# Patient Record
Sex: MALE | Race: WHITE | NOT HISPANIC OR LATINO | Employment: OTHER | URBAN - METROPOLITAN AREA
[De-identification: names, ages, dates, MRNs, and addresses within clinical notes are randomized per-mention and may not be internally consistent; named-entity substitution may affect disease eponyms.]

---

## 2017-01-03 ENCOUNTER — HOSPITAL ENCOUNTER (OUTPATIENT)
Dept: MRI IMAGING | Facility: HOSPITAL | Age: 77
Discharge: HOME | End: 2017-01-03
Attending: FAMILY MEDICINE | Admitting: FAMILY MEDICINE

## 2017-04-04 ENCOUNTER — HOSPITAL ENCOUNTER (OUTPATIENT)
Dept: PHYSICAL THERAPY | Facility: HOSPITAL | Age: 77
Setting detail: RECURRING SERIES
Discharge: HOME | End: 2017-04-28
Attending: NEUROLOGICAL SURGERY | Admitting: NEUROLOGICAL SURGERY

## 2018-02-16 ENCOUNTER — HOSPITAL ENCOUNTER (OUTPATIENT)
Dept: OTHER | Facility: HOSPITAL | Age: 78
Setting detail: SPECIMEN
Discharge: HOME | End: 2018-02-16
Attending: UROLOGY | Admitting: UROLOGY

## 2018-10-10 ENCOUNTER — HOSPITAL ENCOUNTER (OUTPATIENT)
Dept: CARDIOLOGY | Facility: HOSPITAL | Age: 78
Discharge: HOME | End: 2018-10-10
Attending: INTERNAL MEDICINE | Admitting: INTERNAL MEDICINE

## 2018-10-12 ENCOUNTER — HOSPITAL ENCOUNTER (OUTPATIENT)
Dept: OTHER | Facility: HOSPITAL | Age: 78
Discharge: HOME | End: 2018-10-12
Attending: INTERNAL MEDICINE | Admitting: INTERNAL MEDICINE

## 2018-10-12 LAB
ANION GAP SERPL CALC-SCNC: 11.4 MMOL/L (ref 10–20)
APTT BLD: 28.1 SEC (ref 24–31)
BASOPHILS # BLD AUTO: 0 10*3/UL (ref 0–0.2)
BASOPHILS NFR BLD AUTO: 1 % (ref 0–2)
BUN SERPL-MCNC: 11 MG/DL (ref 8–20)
BUN/CREAT SERPL: 10 (ref 6.2–20.3)
CALCIUM SERPL-MCNC: 9.1 MG/DL (ref 8.9–10.3)
CHLORIDE SERPL-SCNC: 105 MMOL/L (ref 101–111)
CHOLEST SERPL-MCNC: 120 MG/DL
CHOLEST/HDLC SERPL: 3.3 {RATIO}
CONV CO2: 28 MMOL/L (ref 22–32)
CONV LDL CHOLESTEROL DIRECT: 57 MG/DL (ref 0–100)
CREAT UR-MCNC: 1.1 MG/DL (ref 0.7–1.2)
DIFFERENTIAL METHOD BLD: (no result)
EOSINOPHIL # BLD AUTO: 0.4 10*3/UL (ref 0–0.3)
EOSINOPHIL # BLD AUTO: 6 % (ref 0–3)
ERYTHROCYTE [DISTWIDTH] IN BLOOD BY AUTOMATED COUNT: 12.9 % (ref 11.5–14.5)
GLUCOSE SERPL-MCNC: 100 MG/DL (ref 65–99)
HCT VFR BLD AUTO: 46.1 % (ref 40–54)
HDLC SERPL-MCNC: 36 MG/DL
HGB BLD-MCNC: 16 G/DL (ref 14–18)
INR PPP: 1.1
LDLC/HDLC SERPL: 1.6 {RATIO}
LIPID INTERPRETATION: ABNORMAL
LYMPHOCYTES # BLD AUTO: 1.4 10*3/UL (ref 0.8–4.8)
LYMPHOCYTES NFR BLD AUTO: 20 % (ref 18–42)
MCH RBC QN AUTO: 31.9 PG (ref 26–32)
MCHC RBC AUTO-ENTMCNC: 34.7 G/DL (ref 32–36)
MCV RBC AUTO: 92.1 FL (ref 80–94)
MONOCYTES # BLD AUTO: 0.6 10*3/UL (ref 0.1–1.3)
MONOCYTES NFR BLD AUTO: 8 % (ref 2–11)
NEUTROPHILS # BLD AUTO: 4.5 10*3/UL (ref 2.3–8.6)
NEUTROPHILS NFR BLD AUTO: 65 % (ref 50–75)
NRBC BLD AUTO-RTO: 0 /100{WBCS}
NRBC/RBC NFR BLD MANUAL: 0 10*3/UL
PLATELET # BLD AUTO: 226 10*3/UL (ref 150–450)
PMV BLD AUTO: 8.1 FL (ref 7.4–10.4)
POTASSIUM SERPL-SCNC: 4.4 MMOL/L (ref 3.6–5.1)
PROTHROMBIN TIME: 11.3 SEC (ref 9.6–11.7)
RBC # BLD AUTO: 5.01 10*6/UL (ref 4.6–6)
SODIUM SERPL-SCNC: 140 MMOL/L (ref 136–144)
TRIGL SERPL-MCNC: 95 MG/DL
VLDLC SERPL CALC-MCNC: 26.6 MG/DL
WBC # BLD AUTO: 6.9 10*3/UL (ref 4.5–11.5)

## 2019-08-16 PROBLEM — R94.39 ABNORMAL CARDIOVASCULAR STRESS TEST: Status: ACTIVE | Noted: 2018-10-10

## 2019-08-16 PROBLEM — Z83.3 FAMILY HISTORY OF DIABETES MELLITUS: Status: ACTIVE | Noted: 2019-08-16

## 2019-08-16 PROBLEM — Z82.3 FAMILY HISTORY OF CEREBROVASCULAR ACCIDENT (CVA): Status: ACTIVE | Noted: 2019-08-16

## 2019-08-16 PROBLEM — K21.9 GASTROESOPHAGEAL REFLUX DISEASE: Status: ACTIVE | Noted: 2019-08-16

## 2019-08-16 PROBLEM — E78.5 HYPERLIPIDEMIA: Status: ACTIVE | Noted: 2019-08-16

## 2019-08-16 RX ORDER — ASPIRIN 81 MG/1
TABLET ORAL
COMMUNITY
Start: 2012-06-01 | End: 2019-10-16

## 2019-08-16 RX ORDER — BRIMONIDINE TARTRATE 2 MG/ML
SOLUTION/ DROPS OPHTHALMIC
COMMUNITY
Start: 2019-04-10 | End: 2019-10-16

## 2019-08-16 RX ORDER — ESCITALOPRAM OXALATE 20 MG/1
20 TABLET ORAL DAILY
COMMUNITY
Start: 2019-04-10

## 2019-08-16 RX ORDER — RANITIDINE 150 MG/1
TABLET ORAL
COMMUNITY
Start: 2012-06-01 | End: 2019-10-16

## 2019-08-16 RX ORDER — GABAPENTIN 300 MG/1
CAPSULE ORAL
COMMUNITY
Start: 2015-12-07 | End: 2019-10-16

## 2019-08-16 RX ORDER — VALSARTAN AND HYDROCHLOROTHIAZIDE 160; 12.5 MG/1; MG/1
TABLET, FILM COATED ORAL
COMMUNITY
Start: 2015-12-07 | End: 2019-10-16

## 2019-08-16 RX ORDER — DONEPEZIL HYDROCHLORIDE 5 MG/1
5 TABLET, FILM COATED ORAL 2 TIMES DAILY
COMMUNITY
Start: 2019-04-10 | End: 2019-09-20 | Stop reason: SDUPTHER

## 2019-08-16 RX ORDER — DORZOLAMIDE HCL 20 MG/ML
1 SOLUTION/ DROPS OPHTHALMIC EVERY 24 HOURS
COMMUNITY
Start: 2018-02-15 | End: 2020-07-01

## 2019-08-16 RX ORDER — TAMSULOSIN HYDROCHLORIDE 0.4 MG/1
CAPSULE ORAL
COMMUNITY
Start: 2015-12-07 | End: 2019-10-16

## 2019-08-16 RX ORDER — BUPROPION HYDROCHLORIDE 150 MG/1
TABLET, EXTENDED RELEASE ORAL
COMMUNITY
Start: 2019-04-10 | End: 2019-10-16

## 2019-08-16 RX ORDER — AMLODIPINE BESYLATE 2.5 MG/1
TABLET ORAL EVERY 24 HOURS
COMMUNITY
Start: 2019-04-10 | End: 2019-10-16

## 2019-08-28 ENCOUNTER — OFFICE VISIT (OUTPATIENT)
Dept: CARDIOLOGY | Facility: CLINIC | Age: 79
End: 2019-08-28

## 2019-08-28 VITALS
DIASTOLIC BLOOD PRESSURE: 78 MMHG | HEIGHT: 72 IN | SYSTOLIC BLOOD PRESSURE: 160 MMHG | OXYGEN SATURATION: 96 % | HEART RATE: 60 BPM | WEIGHT: 223.4 LBS | BODY MASS INDEX: 30.26 KG/M2

## 2019-08-28 DIAGNOSIS — I10 ESSENTIAL HYPERTENSION: ICD-10-CM

## 2019-08-28 DIAGNOSIS — G47.33 SLEEP APNEA, OBSTRUCTIVE: ICD-10-CM

## 2019-08-28 DIAGNOSIS — E78.00 PURE HYPERCHOLESTEROLEMIA: ICD-10-CM

## 2019-08-28 DIAGNOSIS — I25.118 CORONARY ARTERY DISEASE OF NATIVE ARTERY OF NATIVE HEART WITH STABLE ANGINA PECTORIS (HCC): Primary | ICD-10-CM

## 2019-08-28 PROCEDURE — 99213 OFFICE O/P EST LOW 20 MIN: CPT | Performed by: INTERNAL MEDICINE

## 2019-08-28 RX ORDER — CHOLECALCIFEROL (VITAMIN D3) 25 MCG
1 TABLET,CHEWABLE ORAL DAILY
Refills: 11 | COMMUNITY
Start: 2019-08-14

## 2019-08-28 NOTE — PROGRESS NOTES
"    Subjective:     Encounter Date:08/28/2019      Patient ID: Naun Brooks is a 79 y.o. male.    Chief Complaint:  History of Present Illness 79-year-old white male with history of coronary artery disease hypertension hyperlipidemia sleep apnea presents to my office for follow-up.  Patient is currently stable without any symptoms of chest pain or shortness of breath at rest on exertion.  No complaints of any PND orthopnea.  No palpitations dizziness syncope or swelling of the feet.  Patient is taking all the medicines regularly.  He has sleep apnea and uses a CPAP machine.    The following portions of the patient's history were reviewed and updated as appropriate: allergies, current medications, past family history, past medical history, past social history, past surgical history and problem list.  Past Medical History:   Diagnosis Date   • Coronary artery disease    • Hyperlipidemia    • Hypertension      Past Surgical History:   Procedure Laterality Date   • CENTRAL SHUNT       /78 (BP Location: Left arm, Patient Position: Sitting)   Pulse 60   Ht 182.9 cm (72\")   Wt 101 kg (223 lb 6.4 oz)   SpO2 96%   BMI 30.30 kg/m²   History reviewed. No pertinent family history.    Current Outpatient Medications:   •  amLODIPine (NORVASC) 2.5 MG tablet, Daily., Disp: , Rfl:   •  aspirin (ASPIR-LOW) 81 MG EC tablet, ASPIR-LOW 81 MG TBEC, Disp: , Rfl:   •  buPROPion SR (WELLBUTRIN SR) 150 MG 12 hr tablet, BUPROPION HCL ER (SR) 150 MG XR12H-TAB, Disp: , Rfl:   •  Cyanocobalamin (B-12) 1000 MCG tablet controlled-release, Take 1 tablet by mouth Daily., Disp: , Rfl: 11  •  Cyanocobalamin (B-12) 1000 MCG/ML kit, B-12 1000 MCG/ML INJECTION KIT, Disp: , Rfl:   •  donepezil (ARICEPT) 5 MG tablet, Daily., Disp: , Rfl:   •  dorzolamide (TRUSOPT) 2 % ophthalmic solution, Daily., Disp: , Rfl:   •  escitalopram (LEXAPRO) 20 MG tablet, Daily., Disp: , Rfl:   •  gabapentin (NEURONTIN) 300 MG capsule, GABAPENTIN 300 MG CAPS, Disp: , " Rfl:   •  hydrocortisone 2.5 % cream, APPLY TO AFFECTED AREA TWICE A DAY, Disp: , Rfl: 3  •  Multiple Vitamins-Minerals (MULTI VITAMIN/MINERALS) tablet, MULTI VITAMIN/MINERALS TABS, Disp: , Rfl:   •  Omega-3 Fatty Acids (EQL OMEGA 3 FISH OIL) 1400 MG capsule, EQL OMEGA 3 FISH OIL 1400 MG ORAL CAPSULE, Disp: , Rfl:   •  raNITIdine (ZANTAC) 150 MG tablet, RANITIDINE  MG TABS, Disp: , Rfl:   •  tamsulosin (FLOMAX) 0.4 MG capsule 24 hr capsule, TAMSULOSIN HCL 0.4 MG CAPS, Disp: , Rfl:   •  valsartan-hydrochlorothiazide (DIOVAN-HCT) 160-12.5 MG per tablet, VALSARTAN-HYDROCHLOROTHIAZIDE 160-12.5 MG TABS, Disp: , Rfl:   •  brimonidine (ALPHAGAN) 0.2 % ophthalmic solution, BRIMONIDINE TARTRATE 0.2 % SOLN, Disp: , Rfl:   Allergies   Allergen Reactions   • Dust Mite Extract Irritability   • Molds & Smuts Irritability   • Tree Extract Irritability     Social History     Socioeconomic History   • Marital status:      Spouse name: Not on file   • Number of children: Not on file   • Years of education: Not on file   • Highest education level: Not on file   Tobacco Use   • Smoking status: Former Smoker   Substance and Sexual Activity   • Alcohol use: No     Frequency: Never     Review of Systems   Constitution: Negative for fever and malaise/fatigue.   Cardiovascular: Negative for chest pain, dyspnea on exertion and palpitations.   Respiratory: Negative for cough and shortness of breath.    Skin: Negative for rash.   Gastrointestinal: Negative for abdominal pain, nausea and vomiting.   Neurological: Negative for focal weakness and headaches.   All other systems reviewed and are negative.             Objective:     Physical Exam   Constitutional: He appears well-developed and well-nourished.   HENT:   Head: Normocephalic and atraumatic.   Eyes: Conjunctivae are normal. No scleral icterus.   Neck: Normal range of motion. Neck supple. No JVD present. Carotid bruit is not present.   Cardiovascular: Normal rate, regular  rhythm, S1 normal, S2 normal, normal heart sounds and intact distal pulses. PMI is not displaced.   Pulmonary/Chest: Effort normal and breath sounds normal. He has no wheezes. He has no rales.   Abdominal: Soft. Bowel sounds are normal.   Neurological: He is alert. He has normal strength.   Skin: Skin is warm and dry. No rash noted.     Procedures    Lab Review:       Assessment:          Diagnosis Plan   1. Coronary artery disease of native artery of native heart with stable angina pectoris (CMS/HCC)     2. Essential hypertension     3. Pure hypercholesterolemia     4. Sleep apnea, obstructive            Plan:       Patient has nonobstructive coronary disease with normal function is currently stable on medications.  Patient blood pressure and heart rate are stable per  Patient has sleep apnea and uses a CPAP machine.  Patient's lipid levels are followed by the primary care doctor.  Continue current medicines and follow her in 6 months

## 2019-09-20 ENCOUNTER — TELEPHONE (OUTPATIENT)
Dept: NEUROLOGY | Facility: CLINIC | Age: 79
End: 2019-09-20

## 2019-09-20 RX ORDER — DONEPEZIL HYDROCHLORIDE 5 MG/1
5 TABLET, FILM COATED ORAL 2 TIMES DAILY
Qty: 60 TABLET | Refills: 1 | Status: SHIPPED | OUTPATIENT
Start: 2019-09-20 | End: 2019-09-30 | Stop reason: DRUGHIGH

## 2019-09-29 RX ORDER — DONEPEZIL HYDROCHLORIDE 5 MG/1
TABLET, FILM COATED ORAL
Qty: 90 TABLET | Refills: 1 | Status: CANCELLED | OUTPATIENT
Start: 2019-09-29

## 2019-09-30 RX ORDER — DONEPEZIL HYDROCHLORIDE 10 MG/1
10 TABLET, FILM COATED ORAL NIGHTLY
Qty: 60 TABLET | Refills: 1 | Status: SHIPPED | OUTPATIENT
Start: 2019-09-30 | End: 2019-10-01 | Stop reason: SDUPTHER

## 2019-10-01 RX ORDER — DONEPEZIL HYDROCHLORIDE 10 MG/1
10 TABLET, FILM COATED ORAL DAILY
Qty: 90 TABLET | Refills: 1 | Status: SHIPPED | OUTPATIENT
Start: 2019-10-01 | End: 2020-04-13

## 2019-10-16 RX ORDER — TAMSULOSIN HYDROCHLORIDE 0.4 MG/1
1 CAPSULE ORAL DAILY
COMMUNITY
End: 2020-07-01

## 2019-10-16 RX ORDER — GABAPENTIN 300 MG/1
300 CAPSULE ORAL 3 TIMES DAILY
COMMUNITY

## 2019-10-16 RX ORDER — BUPROPION HYDROCHLORIDE 150 MG/1
150 TABLET ORAL DAILY
COMMUNITY

## 2019-10-16 RX ORDER — RANITIDINE 150 MG/1
150 TABLET ORAL 2 TIMES DAILY
COMMUNITY
End: 2020-05-14

## 2019-10-16 RX ORDER — VALSARTAN AND HYDROCHLOROTHIAZIDE 160; 12.5 MG/1; MG/1
1 TABLET, FILM COATED ORAL 2 TIMES DAILY
COMMUNITY

## 2019-10-16 RX ORDER — LORATADINE 10 MG/1
10 CAPSULE, LIQUID FILLED ORAL DAILY
COMMUNITY
End: 2020-07-01

## 2019-10-17 ENCOUNTER — ANESTHESIA EVENT (OUTPATIENT)
Dept: GASTROENTEROLOGY | Facility: HOSPITAL | Age: 79
End: 2019-10-17

## 2019-10-18 ENCOUNTER — HOSPITAL ENCOUNTER (OUTPATIENT)
Facility: HOSPITAL | Age: 79
Setting detail: HOSPITAL OUTPATIENT SURGERY
Discharge: HOME | End: 2019-10-18
Attending: INTERNAL MEDICINE | Admitting: INTERNAL MEDICINE

## 2019-10-18 ENCOUNTER — ON CAMPUS - OUTPATIENT (AMBULATORY)
Dept: URBAN - METROPOLITAN AREA HOSPITAL 85 | Facility: HOSPITAL | Age: 79
End: 2019-10-18

## 2019-10-18 ENCOUNTER — ANESTHESIA (OUTPATIENT)
Dept: GASTROENTEROLOGY | Facility: HOSPITAL | Age: 79
End: 2019-10-18

## 2019-10-18 VITALS
SYSTOLIC BLOOD PRESSURE: 111 MMHG | OXYGEN SATURATION: 95 % | HEIGHT: 72 IN | DIASTOLIC BLOOD PRESSURE: 48 MMHG | TEMPERATURE: 97.6 F | BODY MASS INDEX: 30.43 KG/M2 | WEIGHT: 224.65 LBS | RESPIRATION RATE: 12 BRPM | HEART RATE: 48 BPM

## 2019-10-18 DIAGNOSIS — K63.5 POLYP OF COLON: ICD-10-CM

## 2019-10-18 DIAGNOSIS — D12.3 BENIGN NEOPLASM OF TRANSVERSE COLON: ICD-10-CM

## 2019-10-18 DIAGNOSIS — Z86.010 PERSONAL HISTORY OF COLONIC POLYPS: ICD-10-CM

## 2019-10-18 DIAGNOSIS — K57.30 DIVERTICULOSIS OF LARGE INTESTINE WITHOUT PERFORATION OR ABS: ICD-10-CM

## 2019-10-18 DIAGNOSIS — D12.5 BENIGN NEOPLASM OF SIGMOID COLON: ICD-10-CM

## 2019-10-18 DIAGNOSIS — K64.1 SECOND DEGREE HEMORRHOIDS: ICD-10-CM

## 2019-10-18 PROCEDURE — 25010000002 PROPOFOL 200 MG/20ML EMULSION: Performed by: ANESTHESIOLOGY

## 2019-10-18 PROCEDURE — 88305 TISSUE EXAM BY PATHOLOGIST: CPT | Performed by: INTERNAL MEDICINE

## 2019-10-18 PROCEDURE — 45385 COLONOSCOPY W/LESION REMOVAL: CPT | Mod: PT | Performed by: INTERNAL MEDICINE

## 2019-10-18 RX ORDER — SODIUM CHLORIDE 9 MG/ML
9 INJECTION, SOLUTION INTRAVENOUS CONTINUOUS PRN
Status: DISCONTINUED | OUTPATIENT
Start: 2019-10-18 | End: 2019-10-18 | Stop reason: HOSPADM

## 2019-10-18 RX ORDER — PROPOFOL 10 MG/ML
INJECTION, EMULSION INTRAVENOUS AS NEEDED
Status: DISCONTINUED | OUTPATIENT
Start: 2019-10-18 | End: 2019-10-18 | Stop reason: SURG

## 2019-10-18 RX ORDER — SODIUM CHLORIDE 0.9 % (FLUSH) 0.9 %
3 SYRINGE (ML) INJECTION EVERY 12 HOURS SCHEDULED
Status: DISCONTINUED | OUTPATIENT
Start: 2019-10-18 | End: 2019-10-18 | Stop reason: HOSPADM

## 2019-10-18 RX ORDER — SODIUM CHLORIDE 0.9 % (FLUSH) 0.9 %
10 SYRINGE (ML) INJECTION AS NEEDED
Status: DISCONTINUED | OUTPATIENT
Start: 2019-10-18 | End: 2019-10-18 | Stop reason: HOSPADM

## 2019-10-18 RX ORDER — LIDOCAINE HYDROCHLORIDE 20 MG/ML
INJECTION, SOLUTION EPIDURAL; INFILTRATION; INTRACAUDAL; PERINEURAL AS NEEDED
Status: DISCONTINUED | OUTPATIENT
Start: 2019-10-18 | End: 2019-10-18 | Stop reason: SURG

## 2019-10-18 RX ORDER — SODIUM CHLORIDE 0.9 % (FLUSH) 0.9 %
3-10 SYRINGE (ML) INJECTION AS NEEDED
Status: DISCONTINUED | OUTPATIENT
Start: 2019-10-18 | End: 2019-10-18 | Stop reason: HOSPADM

## 2019-10-18 RX ORDER — SODIUM CHLORIDE 9 MG/ML
30 INJECTION, SOLUTION INTRAVENOUS CONTINUOUS PRN
Status: DISCONTINUED | OUTPATIENT
Start: 2019-10-18 | End: 2019-10-18 | Stop reason: HOSPADM

## 2019-10-18 RX ADMIN — PROPOFOL 260 MG: 10 INJECTION, EMULSION INTRAVENOUS at 09:17

## 2019-10-18 RX ADMIN — SODIUM CHLORIDE: 0.9 INJECTION, SOLUTION INTRAVENOUS at 08:42

## 2019-10-18 RX ADMIN — SODIUM CHLORIDE 9 ML/HR: 0.9 INJECTION, SOLUTION INTRAVENOUS at 08:34

## 2019-10-18 RX ADMIN — LIDOCAINE HYDROCHLORIDE 100 MG: 20 INJECTION, SOLUTION EPIDURAL; INFILTRATION; INTRACAUDAL; PERINEURAL at 08:49

## 2019-10-18 NOTE — DISCHARGE INSTRUCTIONS
A responsible adult should stay with you and you should rest quietly for the rest of the day.    Do not drink alcohol, drive, operate any heavy machinery or power tools or make any legal/important decisions for the next 24 hours.     Progress your diet as tolerated.  If you begin to experience severe pain, increased shortness of breath, racing heartbeat or a fever above 101 F, seek immediate medical attention.     Follow up with MD as instructed. Call office for results in 3 to 5 days if needed.    [phone]

## 2019-10-18 NOTE — ANESTHESIA PREPROCEDURE EVALUATION
Anesthesia Evaluation     Patient summary reviewed and Nursing notes reviewed   NPO Solid Status: > 8 hours  NPO Liquid Status: > 8 hours           Airway   Mallampati: II  No difficulty expected  Dental - normal exam     Pulmonary    (+) sleep apnea on CPAP,   Cardiovascular     Rhythm: regular  Rate: abnormal    (+) hypertension, CAD, hyperlipidemia,     ROS comment: Denies known MI, angina  PE comment: Bradycardic.  Pt states he's normally in 40-50's range    Neuro/Psych  (+) dizziness/light headedness,     GI/Hepatic/Renal/Endo    (+)  GERD,      Musculoskeletal     Abdominal    Substance History      OB/GYN          Other                        Anesthesia Plan    ASA 3     MAC     Anesthetic plan, all risks, benefits, and alternatives have been provided, discussed and informed consent has been obtained with: patient.

## 2019-10-18 NOTE — OP NOTE
COLONOSCOPY  Procedure Report    Patient Name:  Naun Brooks  YOB: 1940    Date of Surgery:  10/18/2019     Indications: Personal history of colon polyps    Pre-op Diagnosis:   PERSONAL HX COLON POLYPS         Post-op Diagnosis:  1.  Colonoscopy polypectomy x1 at the hepatic flexure and x1 in the sigmoid colon  2.  Mild to moderate sigmoid diverticulosis      Procedure(s):  COLONOSCOPY with polypectomy x 2 and fulgurate 1 polyp    Staff:  Surgeon(s):  Kevin Cuevas MD         Anesthesia: Monitored Anesthesia Care    Estimated Blood Loss: None  Implants:    Nothing was implanted during the procedure    Specimen:          Hepatic flexure polyp and sigmoid polyp    Complications:  None    Description of Procedure:  Informed consent was obtained from the patient.  They were placed in the left lateral decubitus position and IV conscious sedation was administered by anesthesia while being monitored continuously throughout the procedure.  Digital rectal examination was performed without external hemorrhoid fissure or fistula.  Digital exam of the anal canal rectal vault was unremarkable.  The video Olympus colonoscope was introduced into the rectum and advanced to the cecum where the ileocecal valve and appendiceal orifice were identified and photographed.  The prep was excellent.  On slow withdrawal close inspection was performed.  There are no ischemic, vascular, or inflammatory lesions.  In the hepatic flexure is a 1.5 cm sessile polyp that was removed in piecemeal fashion using hot snare with electrocautery hemostasis.  On continued withdrawal a second polyp of approximately 4 mm size was identified in the sigmoid colon it was snared removed with electrocautery hemostasis.  There is mild to moderate sigmoid diverticulosis.  Grade 2 internal hemorrhoids were noted on retroflexion.  The scope was removed he tolerated the procedure well returned to recovery in good condition.      Impression:  1.   Colonoscopy with polypectomy x2  2.  Diverticulosis    Recommendations:  1.  Call for any postop pain fever bleeding  2.  Call in 3 days for biopsy results.  3.  High-fiber diet  4.  Repeat colonoscopy in 3 years  5.  We appreciate the referral thank you      Kevin Cuevas MD     Date: 10/18/2019  Time: 9:18 AM

## 2019-10-18 NOTE — ANESTHESIA POSTPROCEDURE EVALUATION
Patient: Naun Brooks    Procedure Summary     Date:  10/18/19 Room / Location:  [facility] ENDOSCOPY 4 / [facility] ENDOSCOPY    Anesthesia Start:  0842 Anesthesia Stop:  0920    Procedure:  COLONOSCOPY with polypectomy x 2 and fulgurate 1 polyp (N/A ) Diagnosis:  (PERSONAL HX COLON POLYPS)    Surgeon:  Kevin Cuevas MD Provider:  Yue Olvera MD    Anesthesia Type:  MAC ASA Status:  3          Anesthesia Type: MAC  Last vitals  BP   111/48 (10/18/19 0932)   Temp   97.6 °F (36.4 °C) (10/18/19 0834)   Pulse   (!) 48 (10/18/19 0932)   Resp   12 (10/18/19 0932)     SpO2   95 % (10/18/19 0932)     Post Anesthesia Care and Evaluation    Patient location during evaluation: PHASE II  Patient participation: complete - patient participated  Level of consciousness: awake  Pain scale: See nurse's notes for pain score.  Pain management: adequate  Airway patency: patent  Anesthetic complications: No anesthetic complications  PONV Status: none  Cardiovascular status: acceptable  Respiratory status: acceptable  Hydration status: acceptable    Comments: Patient seen and examined postoperatively; vital signs stable; SpO2 greater than or equal to 90%; cardiopulmonary status stable; nausea/vomiting adequately controlled; pain adequately controlled; no apparent anesthesia complications; patient discharged from anesthesia care when discharge criteria were met

## 2019-10-18 NOTE — H&P
" LOS: 0 days   Patient Care Team:  Martha Lee MD as PCP - General      Subjective     Interval History:     Subjective: History of 1.2 cm pedunculated adenomatous polyp in 2016    ROS:   No chest pain, shortness of breath, or cough.  No melena or hematochezia  No change since scanned H&P       Medication Review:   No current facility-administered medications for this encounter.     Current Outpatient Medications:   •  buPROPion XL (WELLBUTRIN XL) 150 MG 24 hr tablet, Take 150 mg by mouth Daily., Disp: , Rfl:   •  Cyanocobalamin (B-12) 1000 MCG tablet controlled-release, Take 1 tablet by mouth Daily., Disp: , Rfl: 11  •  donepezil (ARICEPT) 10 MG tablet, Take 1 tablet by mouth Daily., Disp: 90 tablet, Rfl: 1  •  dorzolamide (TRUSOPT) 2 % ophthalmic solution, Administer 1 drop to both eyes Daily., Disp: , Rfl:   •  escitalopram (LEXAPRO) 20 MG tablet, Take 20 mg by mouth Daily., Disp: , Rfl:   •  gabapentin (NEURONTIN) 300 MG capsule, Take 300 mg by mouth 2 (Two) Times a Day., Disp: , Rfl:   •  Loratadine 10 MG capsule, Take 10 mg by mouth Daily., Disp: , Rfl:   •  Omega-3 Fatty Acids (EQL OMEGA 3 FISH OIL) 1400 MG capsule, Take 1,600 mg by mouth 2 (Two) Times a Day., Disp: , Rfl:   •  raNITIdine (ZANTAC) 150 MG tablet, Take 150 mg by mouth 2 (Two) Times a Day., Disp: , Rfl:   •  tamsulosin (FLOMAX) 0.4 MG capsule 24 hr capsule, Take 1 capsule by mouth Daily., Disp: , Rfl:   •  valsartan-hydrochlorothiazide (DIOVAN-HCT) 160-12.5 MG per tablet, Take 1 tablet by mouth 2 (Two) Times a Day., Disp: , Rfl:       Objective     Vital Signs  Vitals:    10/16/19 1435   Weight: 102 kg (225 lb)   Height: 182.9 cm (72\")       Physical Exam:    General Appearance:    Awake and alert, in no acute distress   Head:    Normocephalic, without obvious abnormality   Eyes:          Conjunctivae normal, anicteric sclera   Throat:   No oral lesions, no thrush, oral mucosa moist   Neck:   No adenopathy, supple, no JVD   CV/Lungs:     " RRR, respirations regular, even and unlabored   Abdomen:     Soft, non-tender, no rebound or guarding, non-distended, no hepatosplenomegaly   Rectal:     Deferred   Extremities:   No edema, no cyanosis   Skin:   No bruising or rash, no jaundice        Results Review:    Lab Results (last 24 hours)     ** No results found for the last 24 hours. **          Imaging Results (last 24 hours)     ** No results found for the last 24 hours. **            Assessment/Plan   Proceed with scope and MAC anesthesia    Proceed with colonoscopy    Kevin Cuevas MD  10/18/19  7:24 AM

## 2019-10-21 LAB
LAB AP CASE REPORT: NORMAL
PATH REPORT.FINAL DX SPEC: NORMAL
PATH REPORT.GROSS SPEC: NORMAL

## 2020-04-13 RX ORDER — DONEPEZIL HYDROCHLORIDE 10 MG/1
TABLET, FILM COATED ORAL
Qty: 90 TABLET | Refills: 1 | Status: SHIPPED | OUTPATIENT
Start: 2020-04-13 | End: 2020-10-05

## 2020-05-14 ENCOUNTER — OFFICE VISIT (OUTPATIENT)
Dept: NEUROLOGY | Facility: CLINIC | Age: 80
End: 2020-05-14

## 2020-05-14 VITALS
BODY MASS INDEX: 30.91 KG/M2 | WEIGHT: 228.2 LBS | DIASTOLIC BLOOD PRESSURE: 81 MMHG | SYSTOLIC BLOOD PRESSURE: 153 MMHG | HEART RATE: 57 BPM | HEIGHT: 72 IN | TEMPERATURE: 98.2 F

## 2020-05-14 DIAGNOSIS — G47.33 SLEEP APNEA, OBSTRUCTIVE: Primary | ICD-10-CM

## 2020-05-14 DIAGNOSIS — R41.3 MEMORY IMPAIRMENT: ICD-10-CM

## 2020-05-14 PROCEDURE — 99214 OFFICE O/P EST MOD 30 MIN: CPT | Performed by: PSYCHIATRY & NEUROLOGY

## 2020-05-14 RX ORDER — FAMOTIDINE 10 MG
10 TABLET ORAL 2 TIMES DAILY
COMMUNITY
End: 2020-07-01

## 2020-05-14 RX ORDER — FLUTICASONE PROPIONATE 50 MCG
2 SPRAY, SUSPENSION (ML) NASAL DAILY
COMMUNITY

## 2020-05-14 NOTE — PROGRESS NOTES
Sleep medicine follow-up visit    Naun Brooks   1940  79 y.o. male   DATE OF SERVICE: 5/14/2020     Yearly f/u for cpap compliance, pt. doing well with pap therapy.  pt. states sleeps fair uses FFM and goes through [facility] for supplies. Patient having issues with the mask bothering his nose.    Pt has trouble going to sleep and then wakes too early   Wake up at 630 to 7 goes to sleep about midnight.  Takes many short naps during the day    Has some pain in feet for which he takes gabapentin    SLEEP TESTING HISTORY:    On NPSG at [facility] , 05/07/2006 patient had Severe obstructive sleep apnea syndrome with apnea-hypopnea index of 79 per sleep hour, minimum SpO2 of 85%    The compliance data  Not available, no recent  Data available    Memory impairment, currently taking Aricept 10mg qd no change. MMSE 19    Obesity, has gained weight.    Review of Systems   Constitutional: Negative for appetite change and fatigue.   HENT: Positive for postnasal drip. Negative for sinus pressure and sinus pain.    Eyes: Positive for visual disturbance. Negative for pain and itching.   Respiratory: Positive for apnea. Negative for cough and shortness of breath.    Gastrointestinal: Negative for constipation and diarrhea.   Endocrine: Negative for cold intolerance and heat intolerance.   Genitourinary: Negative for difficulty urinating and frequency.   Musculoskeletal: Positive for back pain. Negative for neck pain.   Allergic/Immunologic: Positive for environmental allergies. Negative for food allergies.   Neurological: Negative for dizziness, tremors, seizures, syncope, facial asymmetry, speech difficulty, weakness, light-headedness, numbness and headaches.   Psychiatric/Behavioral: Positive for agitation. Negative for confusion.     I reviewed and addressed ROS entered by MA.        The following portions of the patient's history were reviewed and updated as appropriate: allergies, current medications, past family history, past  medical history, past social history, past surgical history and problem list.      History reviewed. No pertinent family history.    Past Medical History:   Diagnosis Date   • Chronic vertigo 12/7/2016   • Coronary artery disease    • GERD (gastroesophageal reflux disease)    • Hyperlipidemia    • Hypertension    • Memory loss        Social History     Socioeconomic History   • Marital status:      Spouse name: Not on file   • Number of children: Not on file   • Years of education: Not on file   • Highest education level: Not on file   Tobacco Use   • Smoking status: Former Smoker   • Smokeless tobacco: Never Used   Substance and Sexual Activity   • Alcohol use: No     Frequency: Never   • Drug use: No   • Sexual activity: Yes     Partners: Female         Current Outpatient Medications:   •  aspirin 81 MG tablet, Take 81 mg by mouth Daily., Disp: , Rfl:   •  buPROPion XL (WELLBUTRIN XL) 150 MG 24 hr tablet, Take 150 mg by mouth Daily., Disp: , Rfl:   •  Cyanocobalamin (B-12) 1000 MCG tablet controlled-release, Take 1 tablet by mouth Daily., Disp: , Rfl: 11  •  donepezil (ARICEPT) 10 MG tablet, TAKE 1 TABLET BY MOUTH EVERY DAY, Disp: 90 tablet, Rfl: 1  •  dorzolamide (TRUSOPT) 2 % ophthalmic solution, Administer 1 drop to both eyes Daily., Disp: , Rfl:   •  escitalopram (LEXAPRO) 20 MG tablet, Take 20 mg by mouth Daily., Disp: , Rfl:   •  famotidine (PEPCID) 10 MG tablet, Take 10 mg by mouth 2 (Two) Times a Day., Disp: , Rfl:   •  fluticasone (FLONASE) 50 MCG/ACT nasal spray, 2 sprays into the nostril(s) as directed by provider Daily., Disp: , Rfl:   •  gabapentin (NEURONTIN) 300 MG capsule, Take 300 mg by mouth 2 (Two) Times a Day., Disp: , Rfl:   •  Loratadine 10 MG capsule, Take 10 mg by mouth Daily., Disp: , Rfl:   •  Omega-3 Fatty Acids (EQL OMEGA 3 FISH OIL) 1400 MG capsule, Take 1,600 mg by mouth 2 (Two) Times a Day., Disp: , Rfl:   •  tamsulosin (FLOMAX) 0.4 MG capsule 24 hr capsule, Take 1 capsule by  "mouth Daily., Disp: , Rfl:   •  valsartan-hydrochlorothiazide (DIOVAN-HCT) 160-12.5 MG per tablet, Take 1 tablet by mouth 2 (Two) Times a Day., Disp: , Rfl:     Allergies   Allergen Reactions   • Dust Mite Extract Irritability   • Molds & Smuts Irritability   • Tree Extract Irritability        PHYSICAL EXAMINATION:  Vitals:    05/14/20 0842   BP: 153/81   Pulse: 57   Temp: 98.2 °F (36.8 °C)      Body mass index is 30.95 kg/m².       HEENT: Normal.      EXTREMITIES: No edema.     IMPRESSION:   Patient with obstructive sleep apnea syndrome with hypersomnia successfully treated with CPAP therapy and is compliant and benefiting from it.     Poor memory, stable, mmse score was 16 last year and 19 today,     RECOMMENDATIONS:     1. Continue present CPAP.     2. Follow up 1 year.     3. Poor memory , stable on Aricept and B12    4. Pt encouraged to lose weight    EPWORTH SLEEPINESS SCALE  Sitting and reading  0  WatchingTV  3  Sitting, inactive, in a public place  0  As a passenger in a car for 1 hour w/o a break  0  Lying down to rest in the afternoon  0  Sitting and talking to someone  0  Sitting quietly after a lunch  3  In a car, while stopped for traffic or a light  0  Total 6    Maximum   Score Patient's   Score Questions   5 5 \"What is the year?Season?Date?Day of the week?Month?\"   5 4 \"Where are we now: State?County?Town/city?Hospital?Floor?\"   3 3 3 Unrelated objects Number of trials:___   5 0 Count backward from 100 by sevens or spell WORLD backwards   3 1 Name 3 things from above   2 2 Identify 2 objects   1 0 Repeat the phrase: No ifs, ands,or buts.   3 3 Take paper in right hand, fold it in half, and put it on the floor.   1 1 Please read this and do what it says. \"Close your eyes\"   1 0 Make up and write a sentence about anything. Noun and verb   1 0 Copy this picture 10 angles must be present.   30 19 Total MMSE         This document has been electronically signed by Joseph Seipel, MD on May 14, 2020 09:07  "

## 2020-05-18 ENCOUNTER — TELEPHONE (OUTPATIENT)
Dept: NEUROLOGY | Facility: CLINIC | Age: 80
End: 2020-05-18

## 2020-05-18 DIAGNOSIS — G47.33 SLEEP APNEA, OBSTRUCTIVE: Primary | ICD-10-CM

## 2020-05-19 NOTE — TELEPHONE ENCOUNTER
----- Message from Joseph F Seipel, MD sent at 5/14/2020  9:03 AM EDT -----   FF[facility] and goes through [facility]   Having problems with mask leaking  Wants to try different FFM

## 2020-06-23 ENCOUNTER — TRANSCRIBE ORDERS (OUTPATIENT)
Dept: ADMINISTRATIVE | Facility: HOSPITAL | Age: 80
End: 2020-06-23

## 2020-06-25 ENCOUNTER — TRANSCRIBE ORDERS (OUTPATIENT)
Dept: ADMINISTRATIVE | Facility: HOSPITAL | Age: 80
End: 2020-06-25

## 2020-06-25 DIAGNOSIS — R60.0 LEG EDEMA, RIGHT: Primary | ICD-10-CM

## 2020-07-01 ENCOUNTER — OFFICE VISIT (OUTPATIENT)
Dept: CARDIOLOGY | Facility: CLINIC | Age: 80
End: 2020-07-01

## 2020-07-01 ENCOUNTER — HOSPITAL ENCOUNTER (OUTPATIENT)
Dept: CARDIOLOGY | Facility: HOSPITAL | Age: 80
Discharge: HOME | End: 2020-07-01
Admitting: FAMILY MEDICINE

## 2020-07-01 VITALS
DIASTOLIC BLOOD PRESSURE: 80 MMHG | WEIGHT: 230 LBS | BODY MASS INDEX: 31.15 KG/M2 | HEIGHT: 72 IN | OXYGEN SATURATION: 93 % | SYSTOLIC BLOOD PRESSURE: 161 MMHG | HEART RATE: 57 BPM

## 2020-07-01 DIAGNOSIS — I25.118 CORONARY ARTERY DISEASE OF NATIVE ARTERY OF NATIVE HEART WITH STABLE ANGINA PECTORIS (HCC): Primary | ICD-10-CM

## 2020-07-01 DIAGNOSIS — R60.0 LEG EDEMA, RIGHT: ICD-10-CM

## 2020-07-01 DIAGNOSIS — E78.00 PURE HYPERCHOLESTEROLEMIA: ICD-10-CM

## 2020-07-01 DIAGNOSIS — G47.33 SLEEP APNEA, OBSTRUCTIVE: ICD-10-CM

## 2020-07-01 DIAGNOSIS — I10 ESSENTIAL HYPERTENSION: ICD-10-CM

## 2020-07-01 LAB
BH CV LOWER VASCULAR LEFT COMMON FEMORAL AUGMENT: NORMAL
BH CV LOWER VASCULAR LEFT COMMON FEMORAL COMPETENT: NORMAL
BH CV LOWER VASCULAR LEFT COMMON FEMORAL COMPRESS: NORMAL
BH CV LOWER VASCULAR LEFT COMMON FEMORAL PHASIC: NORMAL
BH CV LOWER VASCULAR LEFT COMMON FEMORAL SPONT: NORMAL
BH CV LOWER VASCULAR RIGHT COMMON FEMORAL AUGMENT: NORMAL
BH CV LOWER VASCULAR RIGHT COMMON FEMORAL COMPETENT: NORMAL
BH CV LOWER VASCULAR RIGHT COMMON FEMORAL COMPRESS: NORMAL
BH CV LOWER VASCULAR RIGHT COMMON FEMORAL PHASIC: NORMAL
BH CV LOWER VASCULAR RIGHT COMMON FEMORAL SPONT: NORMAL
BH CV LOWER VASCULAR RIGHT DISTAL FEMORAL COMPRESS: NORMAL
BH CV LOWER VASCULAR RIGHT GASTRONEMIUS COMPRESS: NORMAL
BH CV LOWER VASCULAR RIGHT GREATER SAPH AK COMPRESS: NORMAL
BH CV LOWER VASCULAR RIGHT GREATER SAPH BK COMPRESS: NORMAL
BH CV LOWER VASCULAR RIGHT LESSER SAPH COMPRESS: NORMAL
BH CV LOWER VASCULAR RIGHT MID FEMORAL AUGMENT: NORMAL
BH CV LOWER VASCULAR RIGHT MID FEMORAL COMPETENT: NORMAL
BH CV LOWER VASCULAR RIGHT MID FEMORAL COMPRESS: NORMAL
BH CV LOWER VASCULAR RIGHT MID FEMORAL PHASIC: NORMAL
BH CV LOWER VASCULAR RIGHT MID FEMORAL SPONT: NORMAL
BH CV LOWER VASCULAR RIGHT PERONEAL COMPRESS: NORMAL
BH CV LOWER VASCULAR RIGHT POPLITEAL AUGMENT: NORMAL
BH CV LOWER VASCULAR RIGHT POPLITEAL COMPETENT: NORMAL
BH CV LOWER VASCULAR RIGHT POPLITEAL COMPRESS: NORMAL
BH CV LOWER VASCULAR RIGHT POPLITEAL PHASIC: NORMAL
BH CV LOWER VASCULAR RIGHT POPLITEAL SPONT: NORMAL
BH CV LOWER VASCULAR RIGHT POSTERIOR TIBIAL COMPRESS: NORMAL
BH CV LOWER VASCULAR RIGHT PROXIMAL FEMORAL COMPRESS: NORMAL
BH CV LOWER VASCULAR RIGHT SAPHENOFEMORAL JUNCTION AUGMENT: NORMAL
BH CV LOWER VASCULAR RIGHT SAPHENOFEMORAL JUNCTION COMPETENT: NORMAL
BH CV LOWER VASCULAR RIGHT SAPHENOFEMORAL JUNCTION COMPRESS: NORMAL
BH CV LOWER VASCULAR RIGHT SAPHENOFEMORAL JUNCTION PHASIC: NORMAL
BH CV LOWER VASCULAR RIGHT SAPHENOFEMORAL JUNCTION SPONT: NORMAL

## 2020-07-01 PROCEDURE — 93971 EXTREMITY STUDY: CPT

## 2020-07-01 PROCEDURE — 99213 OFFICE O/P EST LOW 20 MIN: CPT | Performed by: INTERNAL MEDICINE

## 2020-07-01 RX ORDER — DORZOLAMIDE HYDROCHLORIDE AND TIMOLOL MALEATE 20; 5 MG/ML; MG/ML
1 SOLUTION/ DROPS OPHTHALMIC 2 TIMES DAILY
COMMUNITY
Start: 2020-06-18

## 2020-07-01 RX ORDER — VALSARTAN 160 MG/1
TABLET ORAL
COMMUNITY
Start: 2020-05-10 | End: 2020-07-01

## 2020-07-01 NOTE — PROGRESS NOTES
"    Subjective:     Encounter Date:07/01/2020      Patient ID: Naun Brooks is a 80 y.o. male.    Chief Complaint:  History of Present Illness 80-year-old white male with history of coronary disease stress post ablation the past history of hypertension hyperlipidemia presents to my office for follow-up.  Patient is currently stable without any signs of chest pain or shortness of breath at rest on exertion.  No complains of any PND orthopnea.  No palpitation dizziness syncope or swelling of the feet.  Patient has been taking all the medicines regularly.  Patient does not smoke.  He is tried exercise regular.  He follows a good diet    The following portions of the patient's history were reviewed and updated as appropriate: allergies, current medications, past family history, past medical history, past social history, past surgical history and problem list.  Past Medical History:   Diagnosis Date   • Chronic vertigo 12/7/2016   • Coronary artery disease    • GERD (gastroesophageal reflux disease)    • Hyperlipidemia    • Hypertension    • Memory loss      Past Surgical History:   Procedure Laterality Date   • BRAIN SURGERY     • CENTRAL SHUNT     • COLONOSCOPY     • COLONOSCOPY N/A 10/18/2019    Procedure: COLONOSCOPY with polypectomy x 2 and fulgurate 1 polyp;  Surgeon: Kevin Cuevas MD;  Location: [facility] ENDOSCOPY;  Service: Gastroenterology   • EYE SURGERY     • HEMORRHOID BANDING     • JOINT REPLACEMENT      hip and knee   • PROSTATE SURGERY       /80 (BP Location: Left arm, Patient Position: Sitting)   Pulse 57   Ht 182.9 cm (72\")   Wt 104 kg (230 lb)   SpO2 93%   BMI 31.19 kg/m²   History reviewed. No pertinent family history.    Current Outpatient Medications:   •  aspirin 81 MG tablet, Take 81 mg by mouth Daily., Disp: , Rfl:   •  buPROPion XL (WELLBUTRIN XL) 150 MG 24 hr tablet, Take 150 mg by mouth Daily., Disp: , Rfl:   •  Cyanocobalamin (B-12) 1000 MCG tablet controlled-release, Take 1 " tablet by mouth Daily., Disp: , Rfl: 11  •  donepezil (ARICEPT) 10 MG tablet, TAKE 1 TABLET BY MOUTH EVERY DAY, Disp: 90 tablet, Rfl: 1  •  dorzolamide-timolol (COSOPT) 22.3-6.8 MG/ML ophthalmic solution, Administer 1 drop to both eyes 2 (Two) Times a Day., Disp: , Rfl:   •  escitalopram (LEXAPRO) 20 MG tablet, Take 20 mg by mouth Daily., Disp: , Rfl:   •  fluticasone (FLONASE) 50 MCG/ACT nasal spray, 2 sprays into the nostril(s) as directed by provider Daily., Disp: , Rfl:   •  gabapentin (NEURONTIN) 300 MG capsule, Take 300 mg by mouth 2 (Two) Times a Day., Disp: , Rfl:   •  Omega-3 Fatty Acids (EQL OMEGA 3 FISH OIL) 1400 MG capsule, Take 1,600 mg by mouth 2 (Two) Times a Day., Disp: , Rfl:   •  raNITIdine HCl (RANITIDINE 150 MAX STRENGTH PO), Take  by mouth., Disp: , Rfl:   •  valsartan-hydrochlorothiazide (DIOVAN-HCT) 320-25 MG per tablet, Take 1 tablet by mouth 2 (Two) Times a Day., Disp: , Rfl:   Allergies   Allergen Reactions   • Dust Mite Extract Irritability   • Molds & Smuts Irritability   • Tree Extract Irritability     Social History     Socioeconomic History   • Marital status:      Spouse name: Not on file   • Number of children: Not on file   • Years of education: Not on file   • Highest education level: Not on file   Tobacco Use   • Smoking status: Former Smoker   • Smokeless tobacco: Never Used   Substance and Sexual Activity   • Alcohol use: No     Frequency: Never   • Drug use: No   • Sexual activity: Yes     Partners: Female     Review of Systems   Constitution: Negative for fever and malaise/fatigue.   Cardiovascular: Negative for chest pain, dyspnea on exertion, leg swelling and palpitations.   Respiratory: Negative for cough and shortness of breath.    Skin: Negative for rash.   Gastrointestinal: Negative for abdominal pain, nausea and vomiting.   Neurological: Negative for focal weakness, headaches and light-headedness.   All other systems reviewed and are negative.              Objective:     Physical Exam   Constitutional: He appears well-developed and well-nourished.   HENT:   Head: Normocephalic and atraumatic.   Eyes: Conjunctivae are normal. No scleral icterus.   Neck: Normal range of motion. Neck supple. No JVD present. Carotid bruit is not present.   Cardiovascular: Normal rate, regular rhythm, S1 normal, S2 normal, normal heart sounds and intact distal pulses. PMI is not displaced.   Pulmonary/Chest: Effort normal and breath sounds normal. He has no wheezes. He has no rales.   Abdominal: Soft. Bowel sounds are normal.   Neurological: He is alert. He has normal strength.   Skin: Skin is warm and dry. No rash noted.     Procedures    Lab Review:       Assessment:          Diagnosis Plan   1. Coronary artery disease of native artery of native heart with stable angina pectoris (CMS/[facility])     2. Pure hypercholesterolemia     3. Essential hypertension     4. Sleep apnea, obstructive            Plan:       Patient has history of coronary disease status post in place in the past and is normally function is currently stable on medical therapy  Patient blood pressure and heart rate stable  Patient's lipids are followed by the primary care doctor  Patient has sleep apnea and using a CPAP machine.

## 2020-10-05 RX ORDER — DONEPEZIL HYDROCHLORIDE 10 MG/1
TABLET, FILM COATED ORAL
Qty: 90 TABLET | Refills: 1 | Status: SHIPPED | OUTPATIENT
Start: 2020-10-05 | End: 2021-03-29

## 2020-10-12 ENCOUNTER — TREATMENT (OUTPATIENT)
Dept: PHYSICAL THERAPY | Facility: CLINIC | Age: 80
End: 2020-10-12

## 2020-10-12 DIAGNOSIS — M51.36 DEGENERATIVE DISC DISEASE, LUMBAR: Primary | ICD-10-CM

## 2020-10-12 DIAGNOSIS — M48.061 SPINAL STENOSIS OF LUMBAR REGION, UNSPECIFIED WHETHER NEUROGENIC CLAUDICATION PRESENT: ICD-10-CM

## 2020-10-12 PROCEDURE — 97161 PT EVAL LOW COMPLEX 20 MIN: CPT | Performed by: PHYSICAL THERAPIST

## 2020-10-12 PROCEDURE — 97110 THERAPEUTIC EXERCISES: CPT | Performed by: PHYSICAL THERAPIST

## 2020-10-12 NOTE — PROGRESS NOTES
"Physical Therapy Initial Evaluation and Plan of Care    Patient: Naun Brooks   : 1940  Diagnosis/ICD-10 Code:  Degenerative disc disease, lumbar [M51.36]  Referring practitioner: Weston Danielle MD  Date of Initial Visit: 10/12/2020  Today's Date: 10/12/2020  Patient seen for 1 sessions           Subjective Questionnaire:  EMY  42%      Subjective 79 yo male with c/o LBP. Pt's wife reports onset of symptoms ~5-6 years ago with unknown factors. Pt had \"surgery\" in  which helped. Pt and his wife report this was not a fusion.  Symptoms returned 2-3 years ago with unknown factors. Tried epidurals without improvement. Primary pain is along the posterolateral L > R LEs to knee level. Pt with episodes of global foot tingling. 5/10 pain now and 10/10 at worst. Symptoms improve with NSAIDs. Exacerbating and alleviating factors are unknown otherwise. Pt's wife reports he has had 3 episodes of falling, which she relates to a \"shuffling\" gait.  MD follow up: prn  Social hx: Lives with spouse. One step to enter home, one flight of stairs in the home but pt does not use these.      Objective          Neurological Testing     Sensation     Lumbar   Left   Intact: light touch    Right   Intact: light touch    Active Range of Motion     Additional Active Range of Motion Details  Moderately limited with flexion and bilateral sidebedngin    Strength/Myotome Testing     Lumbar   Left   Normal strength    Right   Normal strength    Tests     Lumbar     Left   Positive passive SLR.     Right   Positive passive SLR.     Additional Tests Details  Further provocative testing is negative          Assessment & Plan     Assessment  Impairments: abnormal coordination, abnormal gait, abnormal muscle tone, abnormal or restricted ROM, activity intolerance, impaired physical strength, lacks appropriate home exercise program and pain with function  Assessment details: 79 yo with c/o LBP. Pt is with a good response to treatment this " date and would benefit from further evaluation and treatment to address the above impairments.  Functional Limitations: carrying objects, lifting, sleeping, walking, pulling, pushing, moving in bed, sitting, standing, stooping and unable to perform repetitive tasks  Goals  Plan Goals: Short term goals, 1 week: Tolerate HEP progression.  Voice compliance with activity modification.  Report improvement in symptoms.    Long term goals, 5 weeks: Improve EMY score to 28%.  AROM to be wfl.  Symptoms to be centralized.  Independent with HEP.    Plan  Therapy options: will be seen for skilled physical therapy services  Other planned modality interventions: modalities prn  Planned therapy interventions: abdominal trunk stabilization, body mechanics training, flexibility, functional ROM exercises, home exercise program, manual therapy, neuromuscular re-education, postural training, strengthening, stretching and therapeutic activities  Frequency: 1-2 times per week.  Treatment plan discussed with: patient  Plan details: 30 visits per POC, 90 day certification period      Timed:         Manual Therapy:         mins  68493;     Therapeutic Exercise:    25     mins  24106;     Neuromuscular Génesis:        mins  63014;    Therapeutic Activity:          mins  33183;     Gait Training:           mins  54090;     Ultrasound:          mins  63997;    Ionto                                   mins   04190  Self Care                            mins   94133    Un-Timed:  Electrical Stimulation:         mins  78443 ( );  Traction          mins 17434  Low Eval     15     Mins  82417  Mod Eval          Mins  64391  High Eval                            Mins  16131  Re-Eval                               mins  43883        Timed Treatment:   25   mins   Total Treatment:     40   mins    PT SIGNATURE: Harpreet Dupree PT, DPT, OCS  [place] license: [id number]  DATE TREATMENT INITIATED: 10/12/2020    Initial Certification  Certification Period:  1/10/2021  I certify that the therapy services are furnished while this patient is under my care.  The services outlined above are required for this patient and will be reviewed every 90 days.     PHYSICIAN: _____________________________    Weston Danielle MD      DATE:     Please sign and return via fax to [phone]. Thank you, [facility].

## 2020-10-21 ENCOUNTER — TREATMENT (OUTPATIENT)
Dept: PHYSICAL THERAPY | Facility: CLINIC | Age: 80
End: 2020-10-21

## 2020-10-21 DIAGNOSIS — M48.061 SPINAL STENOSIS OF LUMBAR REGION, UNSPECIFIED WHETHER NEUROGENIC CLAUDICATION PRESENT: ICD-10-CM

## 2020-10-21 DIAGNOSIS — M51.36 DEGENERATIVE DISC DISEASE, LUMBAR: Primary | ICD-10-CM

## 2020-10-21 PROCEDURE — 97140 MANUAL THERAPY 1/> REGIONS: CPT | Performed by: PHYSICAL THERAPIST

## 2020-10-21 PROCEDURE — 97110 THERAPEUTIC EXERCISES: CPT | Performed by: PHYSICAL THERAPIST

## 2020-10-21 NOTE — PROGRESS NOTES
Physical Therapy Daily Progress Note  Naun Brooks   1940   Primary Diagnosis: Degenerative disc disease, lumbar [M51.36]   Type: THERAPY  Noted: 10/12/2020   10/21/2020   Visits: 2       Subjective   Pt reports: No new complaints. HEP going well.      Objective     See Exercise, Manual, and Modality Logs for complete treatment.     Patient Education: HEP    Assessment/Plan Fatigued post rx. No increase in pain.      Progress per Plan of Care            Timed:         Manual Therapy:    15     mins  24575;     Therapeutic Exercise:    30     mins  31784;     Neuromuscular Génesis:        mins  47940;    Therapeutic Activity:          mins  84762;     Gait Training:           mins  41745;     Ultrasound:          mins  28183;    Ionto                                   mins   40162  Self Care                            mins   80539    Un-Timed:  Electrical Stimulation:         mins  05414 ( );  Traction          mins 46075  Low Eval          Mins  55967  Mod Eval          Mins  11510  High Eval                            Mins  89883  Re-Eval                               mins  69989    Timed Treatment:   45   mins   Total Treatment:     45   mins    Harpreet Dupree, PT, DPT, OCS  [place] license: [id number]  Physical Therapist

## 2020-10-23 ENCOUNTER — TREATMENT (OUTPATIENT)
Dept: PHYSICAL THERAPY | Facility: CLINIC | Age: 80
End: 2020-10-23

## 2020-10-23 DIAGNOSIS — M51.36 DEGENERATIVE DISC DISEASE, LUMBAR: Primary | ICD-10-CM

## 2020-10-23 DIAGNOSIS — M48.061 SPINAL STENOSIS OF LUMBAR REGION, UNSPECIFIED WHETHER NEUROGENIC CLAUDICATION PRESENT: ICD-10-CM

## 2020-10-23 PROCEDURE — 97110 THERAPEUTIC EXERCISES: CPT | Performed by: PHYSICAL THERAPIST

## 2020-10-23 PROCEDURE — 97140 MANUAL THERAPY 1/> REGIONS: CPT | Performed by: PHYSICAL THERAPIST

## 2020-10-23 NOTE — PROGRESS NOTES
Physical Therapy Daily Progress Note  Naun Brooks   1940   Primary Diagnosis: Degenerative disc disease, lumbar [M51.36]   Type: THERAPY  Noted: 10/12/2020   10/23/2020   Visits: 3       Subjective   Pt reports: Feeling somewhat better. Performing HEP more consistently.       Objective     See Exercise, Manual, and Modality Logs for complete treatment.     Patient Education: HEP    Assessment/Plan Fatigues with progression but no increase in pain.      Progress per Plan of Care            Timed:         Manual Therapy:    8     mins  70827;     Therapeutic Exercise:    35     mins  88410;     Neuromuscular Génesis:        mins  99462;    Therapeutic Activity:          mins  65627;     Gait Training:           mins  30373;     Ultrasound:          mins  04802;    Ionto                                   mins   06081  Self Care                            mins   80528    Un-Timed:  Electrical Stimulation:         mins  76473 ( );  Traction          mins 00055  Low Eval          Mins  65454  Mod Eval          Mins  37477  High Eval                            Mins  06744  Re-Eval                               mins  61107    Timed Treatment:   43   mins   Total Treatment:     43   mins    Harpreet Dupree, PT, DPT, OCS  [place] license: [id number]  Physical Therapist

## 2020-10-26 ENCOUNTER — TREATMENT (OUTPATIENT)
Dept: PHYSICAL THERAPY | Facility: CLINIC | Age: 80
End: 2020-10-26

## 2020-10-26 DIAGNOSIS — M51.36 DEGENERATIVE DISC DISEASE, LUMBAR: Primary | ICD-10-CM

## 2020-10-26 DIAGNOSIS — M48.061 SPINAL STENOSIS OF LUMBAR REGION, UNSPECIFIED WHETHER NEUROGENIC CLAUDICATION PRESENT: ICD-10-CM

## 2020-10-26 PROCEDURE — 97110 THERAPEUTIC EXERCISES: CPT | Performed by: PHYSICAL THERAPIST

## 2020-10-28 ENCOUNTER — TREATMENT (OUTPATIENT)
Dept: PHYSICAL THERAPY | Facility: CLINIC | Age: 80
End: 2020-10-28

## 2020-10-28 DIAGNOSIS — M51.36 DEGENERATIVE DISC DISEASE, LUMBAR: Primary | ICD-10-CM

## 2020-10-28 DIAGNOSIS — M48.061 SPINAL STENOSIS OF LUMBAR REGION, UNSPECIFIED WHETHER NEUROGENIC CLAUDICATION PRESENT: ICD-10-CM

## 2020-10-28 PROCEDURE — 97112 NEUROMUSCULAR REEDUCATION: CPT | Performed by: PHYSICAL THERAPIST

## 2020-10-28 PROCEDURE — 97110 THERAPEUTIC EXERCISES: CPT | Performed by: PHYSICAL THERAPIST

## 2020-10-28 NOTE — PROGRESS NOTES
Physical Therapy Daily Progress Note  Naun Brooks   1940   Primary Diagnosis: Degenerative disc disease, lumbar [M51.36]   Type: THERAPY  Noted: 10/12/2020   10/28/2020   Visits: 5       Subjective   Pt reports: Feeling stronger. No falls recently but still feeling unsteady. HEP going well.      Objective     See Exercise, Manual, and Modality Logs for complete treatment.     Patient Education: HEP    Assessment/Plan Fatigued post rx. Will work on balance activities next visit as appropriate.      Progress per Plan of Care            Timed:         Manual Therapy:         mins  37724;     Therapeutic Exercise:    30     mins  23209;     Neuromuscular Génesis:    10    mins  43315;    Therapeutic Activity:          mins  21140;     Gait Training:           mins  41937;     Ultrasound:          mins  45252;    Ionto                                   mins   49856  Self Care                            mins   12417    Un-Timed:  Electrical Stimulation:         mins  15090 ( );  Traction          mins 86916  Low Eval          Mins  41528  Mod Eval          Mins  23240  High Eval                            Mins  45431  Re-Eval                               mins  28108    Timed Treatment:   40   mins   Total Treatment:     40   mins    Harpreet Dupree, PT, DPT, OCS  [place] license: [id number]  Physical Therapist

## 2020-11-02 ENCOUNTER — TREATMENT (OUTPATIENT)
Dept: PHYSICAL THERAPY | Facility: CLINIC | Age: 80
End: 2020-11-02

## 2020-11-02 DIAGNOSIS — M51.36 DEGENERATIVE DISC DISEASE, LUMBAR: Primary | ICD-10-CM

## 2020-11-02 DIAGNOSIS — M48.061 SPINAL STENOSIS OF LUMBAR REGION, UNSPECIFIED WHETHER NEUROGENIC CLAUDICATION PRESENT: ICD-10-CM

## 2020-11-02 PROCEDURE — 97110 THERAPEUTIC EXERCISES: CPT | Performed by: PHYSICAL THERAPIST

## 2020-11-02 PROCEDURE — 97112 NEUROMUSCULAR REEDUCATION: CPT | Performed by: PHYSICAL THERAPIST

## 2020-11-02 PROCEDURE — 97116 GAIT TRAINING THERAPY: CPT | Performed by: PHYSICAL THERAPIST

## 2020-11-02 NOTE — PROGRESS NOTES
Physical Therapy Daily Progress Note  Naun Brooks   1940   Primary Diagnosis: Degenerative disc disease, lumbar [M51.36]   Type: THERAPY  Noted: 10/12/2020   11/2/2020   Visits: 6       Subjective   Pt reports: Pain symptoms improving. LEs feeling stronger but still limited by balance. HEP going well.      Objective     See Exercise, Manual, and Modality Logs for complete treatment.     Patient Education: HEP    Assessment/Plan Wide MIKAL in standing. Dynamic balance limited.      Progress per Plan of Care            Timed:         Manual Therapy:         mins  66408;     Therapeutic Exercise:    15     mins  46039;     Neuromuscular Génesis:    15    mins  82108;    Therapeutic Activity:          mins  45126;     Gait Training:      15     mins  89151;     Ultrasound:          mins  51445;    Ionto                                   mins   85364  Self Care                            mins   14428    Un-Timed:  Electrical Stimulation:         mins  89630 ( );  Traction          mins 30359  Low Eval          Mins  24124  Mod Eval          Mins  04639  High Eval                            Mins  61536  Re-Eval                               mins  39467    Timed Treatment:   45   mins   Total Treatment:     45   mins    Harpreet Dupree, PT, DPT, OCS  [place] license: [id number]  Physical Therapist

## 2020-11-04 ENCOUNTER — TREATMENT (OUTPATIENT)
Dept: PHYSICAL THERAPY | Facility: CLINIC | Age: 80
End: 2020-11-04

## 2020-11-04 DIAGNOSIS — M51.36 DEGENERATIVE DISC DISEASE, LUMBAR: Primary | ICD-10-CM

## 2020-11-04 DIAGNOSIS — M48.061 SPINAL STENOSIS OF LUMBAR REGION, UNSPECIFIED WHETHER NEUROGENIC CLAUDICATION PRESENT: ICD-10-CM

## 2020-11-04 PROCEDURE — 97110 THERAPEUTIC EXERCISES: CPT | Performed by: PHYSICAL THERAPIST

## 2020-11-04 PROCEDURE — 97112 NEUROMUSCULAR REEDUCATION: CPT | Performed by: PHYSICAL THERAPIST

## 2020-11-04 NOTE — PROGRESS NOTES
Physical Therapy Daily Progress Note  Naun Brooks   1940   Primary Diagnosis: Degenerative disc disease, lumbar [M51.36]   Type: THERAPY  Noted: 10/12/2020   11/4/2020   Visits: 7       Subjective   Pt reports: No new complaints. No falls recently but still feeling unsteady.      Objective     See Exercise, Manual, and Modality Logs for complete treatment.     Patient Education: HEP    Assessment/Plan Fatigued post rx. Some difficulty when in narrow MIKAL or on unsteady surfaces.      Progress per Plan of Care            Timed:         Manual Therapy:         mins  42528;     Therapeutic Exercise:    15     mins  69751;     Neuromuscular Génesis:    30    mins  72111;    Therapeutic Activity:          mins  33319;     Gait Training:           mins  53054;     Ultrasound:          mins  47180;    Ionto                                   mins   70054  Self Care                            mins   89925    Un-Timed:  Electrical Stimulation:         mins  06454 ( );  Traction          mins 99208  Low Eval          Mins  23614  Mod Eval          Mins  67541  High Eval                            Mins  53031  Re-Eval                               mins  01464    Timed Treatment:   45   mins   Total Treatment:     45   mins    Harpreet Dupree, PT, DPT, OCS  [place] license: [id number]  Physical Therapist

## 2020-11-10 ENCOUNTER — TREATMENT (OUTPATIENT)
Dept: PHYSICAL THERAPY | Facility: CLINIC | Age: 80
End: 2020-11-10

## 2020-11-10 DIAGNOSIS — M51.36 DEGENERATIVE DISC DISEASE, LUMBAR: Primary | ICD-10-CM

## 2020-11-10 DIAGNOSIS — M48.061 SPINAL STENOSIS OF LUMBAR REGION, UNSPECIFIED WHETHER NEUROGENIC CLAUDICATION PRESENT: ICD-10-CM

## 2020-11-10 PROCEDURE — 97110 THERAPEUTIC EXERCISES: CPT | Performed by: PHYSICAL THERAPIST

## 2020-11-10 PROCEDURE — 97112 NEUROMUSCULAR REEDUCATION: CPT | Performed by: PHYSICAL THERAPIST

## 2020-11-10 NOTE — PROGRESS NOTES
Physical Therapy Daily Progress Note  Naun Brooks   1940   Primary Diagnosis: Degenerative disc disease, lumbar [M51.36]   Type: THERAPY  Noted: 10/12/2020   11/10/2020   Visits: 8       Subjective   Pt reports: No new complaints. HEP going well.      Objective     See Exercise, Manual, and Modality Logs for complete treatment.     Patient Education: HEP    Assessment/Plan Fatigued post rx. Difficulty with dynamic balance as MIKAL narrows.      Progress per Plan of Care            Timed:         Manual Therapy:         mins  89628;     Therapeutic Exercise:    15     mins  38892;     Neuromuscular Génesis:    30    mins  64236;    Therapeutic Activity:          mins  69275;     Gait Training:           mins  15435;     Ultrasound:          mins  20466;    Ionto                                   mins   15688  Self Care                            mins   46440    Un-Timed:  Electrical Stimulation:         mins  18703 ( );  Traction          mins 71467  Low Eval          Mins  48948  Mod Eval          Mins  31003  High Eval                            Mins  94901  Re-Eval                               mins  04132    Timed Treatment:   45   mins   Total Treatment:     45   mins    Harpreet Dupree, PT, DPT, OCS  [place] license: [id number]  Physical Therapist

## 2020-11-13 ENCOUNTER — TREATMENT (OUTPATIENT)
Dept: PHYSICAL THERAPY | Facility: CLINIC | Age: 80
End: 2020-11-13

## 2020-11-13 DIAGNOSIS — M51.36 DEGENERATIVE DISC DISEASE, LUMBAR: Primary | ICD-10-CM

## 2020-11-13 DIAGNOSIS — M48.061 SPINAL STENOSIS OF LUMBAR REGION, UNSPECIFIED WHETHER NEUROGENIC CLAUDICATION PRESENT: ICD-10-CM

## 2020-11-13 PROCEDURE — 97116 GAIT TRAINING THERAPY: CPT | Performed by: PHYSICAL THERAPIST

## 2020-11-13 PROCEDURE — 97112 NEUROMUSCULAR REEDUCATION: CPT | Performed by: PHYSICAL THERAPIST

## 2020-11-13 PROCEDURE — 97110 THERAPEUTIC EXERCISES: CPT | Performed by: PHYSICAL THERAPIST

## 2020-11-13 NOTE — PROGRESS NOTES
Physical Therapy Daily Progress Note  Naun Brooks   1940   Primary Diagnosis: Degenerative disc disease, lumbar [M51.36]   Type: THERAPY  Noted: 10/12/2020   11/13/2020   Visits: 9       Subjective   Pt reports: No new complaints. No falls and LBP improving. HEP going well.      Objective     See Exercise, Manual, and Modality Logs for complete treatment.     Patient Education: HEP    Assessment/Plan Fatigued post rx. No increase in pain symptoms. Reassess next visit.      Progress per Plan of Care            Timed:         Manual Therapy:         mins  32613;     Therapeutic Exercise:    15     mins  20714;     Neuromuscular Génesis:    15    mins  03075;    Therapeutic Activity:          mins  64123;     Gait Training:      15     mins  20806;     Ultrasound:          mins  30350;    Ionto                                   mins   15830  Self Care                            mins   54406    Un-Timed:  Electrical Stimulation:         mins  79737 ( );  Traction          mins 98323  Low Eval          Mins  65008  Mod Eval          Mins  09681  High Eval                            Mins  85856  Re-Eval                               mins  20783    Timed Treatment:   45   mins   Total Treatment:     45   mins    Harpreet Dupree PT, DPT, OCS  [place] license: [id number]  Physical Therapist

## 2020-11-17 ENCOUNTER — TREATMENT (OUTPATIENT)
Dept: PHYSICAL THERAPY | Facility: CLINIC | Age: 80
End: 2020-11-17

## 2020-11-17 DIAGNOSIS — M51.36 DEGENERATIVE DISC DISEASE, LUMBAR: Primary | ICD-10-CM

## 2020-11-17 DIAGNOSIS — M48.061 SPINAL STENOSIS OF LUMBAR REGION, UNSPECIFIED WHETHER NEUROGENIC CLAUDICATION PRESENT: ICD-10-CM

## 2020-11-17 PROCEDURE — 97112 NEUROMUSCULAR REEDUCATION: CPT | Performed by: PHYSICAL THERAPIST

## 2020-11-17 PROCEDURE — 97110 THERAPEUTIC EXERCISES: CPT | Performed by: PHYSICAL THERAPIST

## 2020-11-17 PROCEDURE — 97116 GAIT TRAINING THERAPY: CPT | Performed by: PHYSICAL THERAPIST

## 2020-11-17 NOTE — PROGRESS NOTES
"Re-Assessment / Re-Certification        Patient: Naun Brooks   : 1940  Diagnosis/ICD-10 Code:  Degenerative disc disease, lumbar [M51.36]  Referring practitioner: Weston Danielle MD  Date of Initial Visit: Type: THERAPY  Noted: 10/12/2020  Today's Date: 2020  Patient seen for 10 sessions      Subjective:     Subjective Questionnaire: Oswestry: 0%  Clinical Progress: improved  Home Program Compliance: Yes  Treatment has included: therapeutic exercise, neuromuscular re-education, manual therapy and gait training    Subjective Pt rates his worst pain at 0/10 and his HEP is going well. Pt reports he is primarily limited by limited balance and difficulty with walking. Denies any episodes of falling since starting therapy and his sensation of being unsteady has slowly improved.   Objective   AROM is wfl and non provocative    Tinetti 25/28  Timed up and go: 13.9 seconds  Five times sit to stand: 16.5 seconds    Wide MIKAL with ambulation and episodes of \"drifting\" with ambulation.  Assessment/Plan   Pt has met his original therapy goals.However, he is still limited by his gait and balance impairments. Recommend continuing with new therapy goals below, primarily working on gait, balance, and functional strength.    Short term goals, 1 week: Tolerate HEP progression. met  Voice compliance with activity modification. met  Report improvement in symptoms. met    Long term goals, 5 weeks: Improve EMY score to 28%. met  AROM to be wfl. met  Symptoms to be centralized. met  Independent with HEP. Met  Improve TUG time to 12 seconds  Improve Five times sit to stand to 14.5 seconds.  Progress toward previous goals: All Met     Recommendations: Continue as planned  Continue 2-3 times per week  30 visits per POC, 90 day certification period  Prognosis to achieve goals: cheryl Dupree, PT, DPT, OCS  [place] license: [id number]  Physical Therapist      Based upon review of the patient's progress and continued therapy " plan, it is my medical opinion that Naun Brooks should continue physical therapy treatment at [facility]  [address] [place] [address] [phone].    Provider: _____________________________    Weston Danielle MD     Timed:         Manual Therapy:         mins  88458;     Therapeutic Exercise:    15     mins  49943;     Neuromuscular Génesis:    15    mins  15222;    Therapeutic Activity:          mins  38595;     Gait Training:      15     mins  36044;     Ultrasound:          mins  98500;    Ionto                                   mins   28534  Self Care                            mins   53221    Un-Timed:  Electrical Stimulation:         mins  40393 ( );  Traction          mins 52625  Low Eval          Mins  29524  Mod Eval          Mins  29159  High Eval                            Mins  24449  Re-Eval                               mins  14424      Timed Treatment:   45   mins   Total Treatment:     45   mins

## 2020-11-19 ENCOUNTER — TREATMENT (OUTPATIENT)
Dept: PHYSICAL THERAPY | Facility: CLINIC | Age: 80
End: 2020-11-19

## 2020-11-19 DIAGNOSIS — M48.061 SPINAL STENOSIS OF LUMBAR REGION, UNSPECIFIED WHETHER NEUROGENIC CLAUDICATION PRESENT: ICD-10-CM

## 2020-11-19 DIAGNOSIS — M51.36 DEGENERATIVE DISC DISEASE, LUMBAR: Primary | ICD-10-CM

## 2020-11-19 PROCEDURE — 97116 GAIT TRAINING THERAPY: CPT | Performed by: PHYSICAL THERAPIST

## 2020-11-19 PROCEDURE — 97112 NEUROMUSCULAR REEDUCATION: CPT | Performed by: PHYSICAL THERAPIST

## 2020-11-19 PROCEDURE — 97110 THERAPEUTIC EXERCISES: CPT | Performed by: PHYSICAL THERAPIST

## 2020-11-19 NOTE — PROGRESS NOTES
Physical Therapy Daily Progress Note  Naun Brooks   1940   Primary Diagnosis: Degenerative disc disease, lumbar [M51.36]   Type: THERAPY  Noted: 10/12/2020   11/19/2020   Visits: 11       Subjective   Pt reports: No recent falls. HEP going well.      Objective     See Exercise, Manual, and Modality Logs for complete treatment.     Patient Education: HEP    Assessment/Plan Fatigued post rx. Cues needed to adjust MIKAL with ambulation.      Progress per Plan of Care            Timed:         Manual Therapy:         mins  17468;     Therapeutic Exercise:    15     mins  40554;     Neuromuscular Génesis:    15    mins  73499;    Therapeutic Activity:          mins  43639;     Gait Training:      15     mins  07911;     Ultrasound:          mins  25114;    Ionto                                   mins   67944  Self Care                            mins   70703    Un-Timed:  Electrical Stimulation:         mins  16413 ( );  Traction          mins 05187  Low Eval          Mins  88796  Mod Eval          Mins  36884  High Eval                            Mins  87752  Re-Eval                               mins  28758    Timed Treatment:   45   mins   Total Treatment:     45   mins    Harpreet Dupree, PT, DPT, OCS  [place] license: [id number]  Physical Therapist

## 2020-11-23 ENCOUNTER — TREATMENT (OUTPATIENT)
Dept: PHYSICAL THERAPY | Facility: CLINIC | Age: 80
End: 2020-11-23

## 2020-11-23 DIAGNOSIS — M51.36 DEGENERATIVE DISC DISEASE, LUMBAR: Primary | ICD-10-CM

## 2020-11-23 DIAGNOSIS — M48.061 SPINAL STENOSIS OF LUMBAR REGION, UNSPECIFIED WHETHER NEUROGENIC CLAUDICATION PRESENT: ICD-10-CM

## 2020-11-23 PROCEDURE — 97112 NEUROMUSCULAR REEDUCATION: CPT | Performed by: PHYSICAL THERAPIST

## 2020-11-23 PROCEDURE — 97110 THERAPEUTIC EXERCISES: CPT | Performed by: PHYSICAL THERAPIST

## 2020-11-23 PROCEDURE — 97116 GAIT TRAINING THERAPY: CPT | Performed by: PHYSICAL THERAPIST

## 2020-11-23 NOTE — PROGRESS NOTES
Physical Therapy Daily Progress Note      Patient: Naun Brooks   : 1940  Diagnosis/ICD-10 Code:  Degenerative disc disease, lumbar [M51.36]  Referring practitioner: Weston Danielle MD  Date of Initial Visit: Type: THERAPY  Noted: 10/12/2020  Today's Date: 2020  Patient seen for 12 sessions             Subjective Pt says that so far things are going ok.    Objective   See Exercise, Manual, and Modality Logs for complete treatment.       Assessment/Plan  Balance was challenged with most exercises, especially when MIKAL is narrowed.    Progress per Plan of Care           Timed:         Manual Therapy:       mins  23236;     Therapeutic Exercise:    15     mins  09653;     Neuromuscular Génesis:    15    mins  81056;    Therapeutic Activity:          mins  10492;     Gait Training:      15     mins  88008;     Ultrasound:          mins  54305;    Ionto                                   mins   21185  Self Care                            mins   01302      Un-Timed:  Electrical Stimulation:         mins  32897 ( );  Dry Needling          mins self-pay  Traction          mins 95460      Timed Treatment:   45   mins   Total Treatment:     45   mins    Weston Ye PTA  Physical Therapist Assistant

## 2020-12-01 ENCOUNTER — TREATMENT (OUTPATIENT)
Dept: PHYSICAL THERAPY | Facility: CLINIC | Age: 80
End: 2020-12-01

## 2020-12-01 DIAGNOSIS — M51.36 DEGENERATIVE DISC DISEASE, LUMBAR: Primary | ICD-10-CM

## 2020-12-01 DIAGNOSIS — M48.061 SPINAL STENOSIS OF LUMBAR REGION, UNSPECIFIED WHETHER NEUROGENIC CLAUDICATION PRESENT: ICD-10-CM

## 2020-12-01 PROCEDURE — 97112 NEUROMUSCULAR REEDUCATION: CPT | Performed by: PHYSICAL THERAPIST

## 2020-12-01 PROCEDURE — 97110 THERAPEUTIC EXERCISES: CPT | Performed by: PHYSICAL THERAPIST

## 2020-12-01 PROCEDURE — 97116 GAIT TRAINING THERAPY: CPT | Performed by: PHYSICAL THERAPIST

## 2020-12-01 NOTE — PROGRESS NOTES
Physical Therapy Daily Progress Note  Naun Brooks   1940   Primary Diagnosis: Degenerative disc disease, lumbar [M51.36]   Type: THERAPY  Noted: 10/12/2020   12/1/2020   Visits: 13       Subjective   Pt reports: No recent falls. Still feeling unsteady. HEP going well.      Objective     See Exercise, Manual, and Modality Logs for complete treatment.     Patient Education: HEP    Assessment/Plan Fatigued post rx. Difficulty maintaining narrow MIKAL.      Progress per Plan of Care            Timed:         Manual Therapy:         mins  24721;     Therapeutic Exercise:    15     mins  52516;     Neuromuscular Génesis:    15    mins  69179;    Therapeutic Activity:          mins  45298;     Gait Training:      15     mins  97419;     Ultrasound:          mins  21488;    Ionto                                   mins   03060  Self Care                            mins   80532    Un-Timed:  Electrical Stimulation:         mins  30924 ( );  Traction          mins 50273  Low Eval          Mins  25589  Mod Eval          Mins  33207  High Eval                            Mins  05357  Re-Eval                               mins  86038    Timed Treatment:   45   mins   Total Treatment:     45   mins    Harpreet Dupree, PT, DPT, OCS  [place] license: [id number]  Physical Therapist

## 2020-12-04 ENCOUNTER — TREATMENT (OUTPATIENT)
Dept: PHYSICAL THERAPY | Facility: CLINIC | Age: 80
End: 2020-12-04

## 2020-12-04 DIAGNOSIS — M51.36 DEGENERATIVE DISC DISEASE, LUMBAR: Primary | ICD-10-CM

## 2020-12-04 DIAGNOSIS — M48.061 SPINAL STENOSIS OF LUMBAR REGION, UNSPECIFIED WHETHER NEUROGENIC CLAUDICATION PRESENT: ICD-10-CM

## 2020-12-04 PROCEDURE — 97116 GAIT TRAINING THERAPY: CPT | Performed by: PHYSICAL THERAPIST

## 2020-12-04 PROCEDURE — 97112 NEUROMUSCULAR REEDUCATION: CPT | Performed by: PHYSICAL THERAPIST

## 2020-12-04 PROCEDURE — 97110 THERAPEUTIC EXERCISES: CPT | Performed by: PHYSICAL THERAPIST

## 2020-12-04 NOTE — PROGRESS NOTES
Physical Therapy Daily Progress Note  Naun Brooks   1940   Primary Diagnosis: Degenerative disc disease, lumbar [M51.36]   Type: THERAPY  Noted: 10/12/2020   2020   Visits: 14       Subjective   Pt reports: Pt feels he is at his prior level of function and wants to try independent HEP.      Objective     See Exercise, Manual, and Modality Logs for complete treatment.     Patient Education: HEP    Assessment/Plan Pt to try independent HEP for 2-3 weeks then follow up here if needed per pt wishes. Pt fatigued post rx.      Progress per Plan of Care            Timed:         Manual Therapy:         mins  10354;     Therapeutic Exercise:    10     mins  80274;     Neuromuscular Génesis:    15    mins  94600;    Therapeutic Activity:          mins  14676;     Gait Trainin     mins  66202;     Ultrasound:          mins  41879;    Ionto                                   mins   37231  Self Care                            mins   10045    Un-Timed:  Electrical Stimulation:         mins  16815 ( );  Traction          mins 70311  Low Eval          Mins  82141  Mod Eval          Mins  12797  High Eval                            Mins  01193  Re-Eval                               mins  56617    Timed Treatment:   45   mins   Total Treatment:     45   mins    Harpreet Dupree, PT, DPT, OCS  [place] license: [id number]  Physical Therapist

## 2021-02-12 RX ORDER — MELOXICAM 15 MG/1
TABLET ORAL
COMMUNITY
Start: 2020-12-07 | End: 2021-09-16

## 2021-02-25 ENCOUNTER — OFFICE VISIT (OUTPATIENT)
Dept: CARDIOLOGY | Facility: CLINIC | Age: 81
End: 2021-02-25

## 2021-02-25 VITALS
BODY MASS INDEX: 30.2 KG/M2 | HEIGHT: 72 IN | SYSTOLIC BLOOD PRESSURE: 128 MMHG | HEART RATE: 82 BPM | WEIGHT: 223 LBS | TEMPERATURE: 97.3 F | OXYGEN SATURATION: 90 % | DIASTOLIC BLOOD PRESSURE: 79 MMHG

## 2021-02-25 DIAGNOSIS — G47.33 SLEEP APNEA, OBSTRUCTIVE: ICD-10-CM

## 2021-02-25 DIAGNOSIS — E78.00 PURE HYPERCHOLESTEROLEMIA: ICD-10-CM

## 2021-02-25 DIAGNOSIS — I10 ESSENTIAL HYPERTENSION: ICD-10-CM

## 2021-02-25 DIAGNOSIS — I25.118 CORONARY ARTERY DISEASE OF NATIVE ARTERY OF NATIVE HEART WITH STABLE ANGINA PECTORIS (HCC): Primary | ICD-10-CM

## 2021-02-25 PROCEDURE — 99214 OFFICE O/P EST MOD 30 MIN: CPT | Performed by: INTERNAL MEDICINE

## 2021-02-25 NOTE — PROGRESS NOTES
"    Subjective:     Encounter Date:02/25/2021      Patient ID: Naun Brooks is a 80 y.o. male.    Chief Complaint:  History of Present Illness 80-year-old white male with history of coronary disease hypertension hyperlipidemia sleep apnea presents to my office for follow-up.  Patient is currently stable without any symptoms of chest pain or shortness of breath at rest on exertion.  No complaints any PND orthopnea.  No palpitation dizziness syncope or swelling of the feet.  Patient has been taking all the medicines regularly.  Patient does not smoke.  Patient is trying to exercise regularly and is trying to follow a good diet    The following portions of the patient's history were reviewed and updated as appropriate: allergies, current medications, past family history, past medical history, past social history, past surgical history and problem list.  Past Medical History:   Diagnosis Date   • Angina pectoris (CMS/HCC)    • Arthritis    • Chronic vertigo 12/7/2016   • Coronary artery disease    • GERD (gastroesophageal reflux disease)    • Glaucoma    • Hyperlipidemia    • Hypertension    • Memory loss      Past Surgical History:   Procedure Laterality Date   • BRAIN SURGERY     • CENTRAL SHUNT     • COLONOSCOPY     • COLONOSCOPY N/A 10/18/2019    Procedure: COLONOSCOPY with polypectomy x 2 and fulgurate 1 polyp;  Surgeon: Kevin Cuevas MD;  Location: [facility] ENDOSCOPY;  Service: Gastroenterology   • EYE SURGERY     • HEMORRHOID BANDING     • JOINT REPLACEMENT      hip and knee   • PROSTATE SURGERY       /79   Pulse 82   Temp 97.3 °F (36.3 °C)   Ht 182.9 cm (72\")   Wt 101 kg (223 lb)   SpO2 90%   BMI 30.24 kg/m²   Family History   Problem Relation Age of Onset   • Heart disease Mother    • Stroke Father        Current Outpatient Medications:   •  aspirin 81 MG tablet, Take 81 mg by mouth Daily., Disp: , Rfl:   •  buPROPion XL (WELLBUTRIN XL) 150 MG 24 hr tablet, Take 150 mg by mouth Daily., Disp: " , Rfl:   •  Cyanocobalamin (B-12) 1000 MCG tablet controlled-release, Take 1 tablet by mouth Daily., Disp: , Rfl: 11  •  donepezil (ARICEPT) 10 MG tablet, TAKE 1 TABLET BY MOUTH EVERY DAY, Disp: 90 tablet, Rfl: 1  •  dorzolamide-timolol (COSOPT) 22.3-6.8 MG/ML ophthalmic solution, Administer 1 drop to both eyes 2 (Two) Times a Day., Disp: , Rfl:   •  escitalopram (LEXAPRO) 20 MG tablet, Take 20 mg by mouth Daily., Disp: , Rfl:   •  fluticasone (FLONASE) 50 MCG/ACT nasal spray, 2 sprays into the nostril(s) as directed by provider Daily., Disp: , Rfl:   •  gabapentin (NEURONTIN) 300 MG capsule, Take 300 mg by mouth 2 (Two) Times a Day., Disp: , Rfl:   •  Omega-3 Fatty Acids (EQL OMEGA 3 FISH OIL) 1400 MG capsule, Take 1,600 mg by mouth 2 (Two) Times a Day., Disp: , Rfl:   •  raNITIdine HCl (RANITIDINE 150 MAX STRENGTH PO), Take  by mouth., Disp: , Rfl:   •  valsartan-hydrochlorothiazide (DIOVAN-HCT) 320-25 MG per tablet, Take 1 tablet by mouth 2 (Two) Times a Day., Disp: , Rfl:   •  meloxicam (MOBIC) 15 MG tablet, , Disp: , Rfl:   Allergies   Allergen Reactions   • Dust Mite Extract Irritability   • Molds & Smuts Irritability   • Tree Extract Irritability     Social History     Socioeconomic History   • Marital status:      Spouse name: Not on file   • Number of children: Not on file   • Years of education: Not on file   • Highest education level: Not on file   Tobacco Use   • Smoking status: Former Smoker   • Smokeless tobacco: Never Used   Substance and Sexual Activity   • Alcohol use: No     Frequency: Never   • Drug use: No   • Sexual activity: Yes     Partners: Female     Review of Systems   Constitution: Negative for fever and malaise/fatigue.   Cardiovascular: Negative for chest pain, dyspnea on exertion, leg swelling and palpitations.   Respiratory: Negative for cough and shortness of breath.    Skin: Negative for rash.   Gastrointestinal: Negative for abdominal pain, nausea and vomiting.    Neurological: Negative for focal weakness, headaches, light-headedness and numbness.   All other systems reviewed and are negative.             Objective:     Constitutional:       Appearance: Well-developed.   Eyes:      General: No scleral icterus.     Conjunctiva/sclera: Conjunctivae normal.   HENT:      Head: Normocephalic and atraumatic.   Neck:      Musculoskeletal: Normal range of motion and neck supple.      Vascular: No carotid bruit or JVD.   Pulmonary:      Effort: Pulmonary effort is normal.      Breath sounds: Normal breath sounds. No wheezing. No rales.   Cardiovascular:      Normal rate. Regular rhythm.   Pulses:     Intact distal pulses.   Abdominal:      General: Bowel sounds are normal.      Palpations: Abdomen is soft.   Skin:     General: Skin is warm and dry.      Findings: No rash.   Neurological:      Mental Status: Alert.       Procedures    Lab Review:         MDM  1.  Coronary disease  Patient has nonoperative coronary disease with normal systolic function is currently stable on medications  2.  Hypertension  Patient blood pressure is currently stable on medical therapy  3.  Hyperlipidemia  Patient's lipid levels and sugar levels are followed by the primary care doctor and is on diet therapy only  4.  Sleep apnea  Patient has sleep apnea and uses CPAP machine.

## 2021-03-29 RX ORDER — DONEPEZIL HYDROCHLORIDE 10 MG/1
TABLET, FILM COATED ORAL
Qty: 90 TABLET | Refills: 1 | Status: SHIPPED | OUTPATIENT
Start: 2021-03-29 | End: 2021-05-20 | Stop reason: SDUPTHER

## 2021-05-17 PROBLEM — M47.816 LUMBAR SPONDYLOSIS: Status: ACTIVE | Noted: 2020-02-05

## 2021-05-17 PROBLEM — M51.36 DEGENERATION OF LUMBAR INTERVERTEBRAL DISC: Status: ACTIVE | Noted: 2020-02-05

## 2021-05-17 PROBLEM — M54.50 LOW BACK PAIN: Status: ACTIVE | Noted: 2020-02-05

## 2021-05-17 PROBLEM — M51.369 DEGENERATION OF LUMBAR INTERVERTEBRAL DISC: Status: ACTIVE | Noted: 2020-02-05

## 2021-05-17 PROBLEM — M96.1 LUMBAR POST-LAMINECTOMY SYNDROME: Status: ACTIVE | Noted: 2020-02-05

## 2021-05-17 NOTE — PROGRESS NOTES
"Chief Complaint  Sleep Apnea    Subjective          Naun Brooks presents to [facility] NEUROLOGY  History of Present Illness  Yearly f/u for cpap compliance, pt. doing well with pap therapy.  pt. states sleeps fair uses FFM and goes through [facility] for supplies.    SCR: avg use 6hr 49min 98% use avg leak 2hr 51min ahi 13    SLEEP TESTING HISTORY:    On NPSG at [facility] , 05/07/2006 patient had Severe obstructive sleep apnea syndrome with apnea-hypopnea index of 79 per sleep hour, minimum SpO2 of 85%      [place] Sleepiness Scale:  Sitting and reading 3 WatchingTV 3  Sitting, inactive, in a public place 1  As a passenger in a car for 1 hour w/o a break  0  Lying down to rest in the afternoon  0  Sitting and talking to someone  3  Sitting quietly after a lunch  3  In a car, while stopped for traffic or a light  0  Total 13    Patient is here to follow up on memory patient is currently taking aricept 10 mg.  His problems with memory have gradually worsened.    Review of Systems   Constitutional: Negative for fatigue and fever.   HENT: Negative for ear pain and facial swelling.    Eyes: Negative for pain and itching.   Respiratory: Negative for cough and shortness of breath.    Cardiovascular: Negative for chest pain.   Gastrointestinal: Negative for abdominal pain and nausea.   Endocrine: Negative for cold intolerance and heat intolerance.   Musculoskeletal: Positive for back pain. Negative for neck pain.   Neurological: Negative for dizziness and headaches.   Psychiatric/Behavioral: Positive for confusion.     Objective   Vital Signs:   /72 (BP Location: Right arm, Patient Position: Sitting, Cuff Size: Adult)   Pulse 59   Temp 98.2 °F (36.8 °C)   Ht 182.9 cm (72\")   Wt 101 kg (222 lb)   BMI 30.11 kg/m²     Physical Exam  Vitals reviewed.   HENT:      Head: Normocephalic.      Nose: Nose normal.   Eyes:      Extraocular Movements: Extraocular movements intact.      Pupils: Pupils are equal, " "round, and reactive to light.   Pulmonary:      Effort: No respiratory distress.   Musculoskeletal:      Right lower leg: No edema.      Left lower leg: No edema.   Neurological:      Mental Status: He is alert and oriented to person, place, and time. Mental status is at baseline.   Psychiatric:         Behavior: Behavior normal.        Result Review :              Maximum   Score Patient's   Score Questions   5 5 \"What is the year?Season?Date?Day of the week?Month?\"   5 5 \"Where are we now: State?County?Town/city?Hospital?Floor?\"   3 3 3 Unrelated objects Number of trials:___   5 0 Count backward from 100 by sevens or spell WORLD backwards   3 3 Name 3 things from above   2 2 Identify 2 objects   1 0 Repeat the phrase: No ifs, ands,or buts.   3 2 Take paper in right hand, fold it in half, and put it on the floor.   1 1 Please read this and do what it says. \"Close your eyes\"   1 1 Make up and write a sentence about anything. Noun and verb   1 0 Copy this picture 10 angles must be present.   30 22 Total MMSE        Assessment and Plan    Diagnoses and all orders for this visit:    1. Obstructive sleep apnea (Primary)  -     Polysomnography 4 or More Parameters With CPAP; Future  -     COVID PRE-OP / PRE-PROCEDURE SCREENING ORDER (NO ISOLATION) - Swab, Nasopharynx; Future    Other orders  -     donepezil (ARICEPT) 23 MG tablet; Take 1 tablet by mouth Every Night.  Dispense: 90 tablet; Refill: 3  -     SCANNED COGNITIVE ASSESSMENT        This patient is not doing well with CPAP with significant trouble with air leaks and is bothered by the ear pressure and his AHI remains above 5.  Therefore we will schedule a CPAP titration study as he may benefit from BiPAP therapy.    For his memory will increase from Aricept 10 mg once a day to 23 mg daily.      Follow Up   Return in about 3 months (around 8/20/2021).  Patient was given instructions and counseling regarding his condition or for health maintenance advice. Please see " specific information pulled into the AVS if appropriate.

## 2021-05-20 ENCOUNTER — OFFICE VISIT (OUTPATIENT)
Dept: NEUROLOGY | Facility: CLINIC | Age: 81
End: 2021-05-20

## 2021-05-20 VITALS
DIASTOLIC BLOOD PRESSURE: 72 MMHG | BODY MASS INDEX: 30.07 KG/M2 | HEIGHT: 72 IN | TEMPERATURE: 98.2 F | HEART RATE: 59 BPM | WEIGHT: 222 LBS | SYSTOLIC BLOOD PRESSURE: 162 MMHG

## 2021-05-20 DIAGNOSIS — G47.33 OBSTRUCTIVE SLEEP APNEA: Primary | ICD-10-CM

## 2021-05-20 PROCEDURE — 99214 OFFICE O/P EST MOD 30 MIN: CPT | Performed by: PSYCHIATRY & NEUROLOGY

## 2021-05-20 RX ORDER — DONEPEZIL HYDROCHLORIDE 23 MG/1
23 TABLET, FILM COATED ORAL NIGHTLY
Qty: 90 TABLET | Refills: 3 | Status: SHIPPED | OUTPATIENT
Start: 2021-05-20 | End: 2022-04-28

## 2021-05-20 RX ORDER — PREDNISONE 20 MG/1
TABLET ORAL
COMMUNITY
Start: 2021-02-26 | End: 2021-09-16

## 2021-05-21 ENCOUNTER — TELEPHONE (OUTPATIENT)
Dept: NEUROLOGY | Facility: CLINIC | Age: 81
End: 2021-05-21

## 2021-05-21 DIAGNOSIS — G47.33 OBSTRUCTIVE SLEEP APNEA: Primary | ICD-10-CM

## 2021-09-16 ENCOUNTER — OFFICE VISIT (OUTPATIENT)
Dept: CARDIOLOGY | Facility: CLINIC | Age: 81
End: 2021-09-16

## 2021-09-16 VITALS
OXYGEN SATURATION: 94 % | HEIGHT: 72 IN | DIASTOLIC BLOOD PRESSURE: 70 MMHG | BODY MASS INDEX: 30.48 KG/M2 | HEART RATE: 54 BPM | SYSTOLIC BLOOD PRESSURE: 160 MMHG | WEIGHT: 225 LBS

## 2021-09-16 DIAGNOSIS — I10 ESSENTIAL HYPERTENSION: ICD-10-CM

## 2021-09-16 DIAGNOSIS — I25.118 CORONARY ARTERY DISEASE OF NATIVE ARTERY OF NATIVE HEART WITH STABLE ANGINA PECTORIS (HCC): Primary | ICD-10-CM

## 2021-09-16 DIAGNOSIS — G47.33 SLEEP APNEA, OBSTRUCTIVE: ICD-10-CM

## 2021-09-16 DIAGNOSIS — E78.00 PURE HYPERCHOLESTEROLEMIA: ICD-10-CM

## 2021-09-16 PROCEDURE — 99214 OFFICE O/P EST MOD 30 MIN: CPT | Performed by: INTERNAL MEDICINE

## 2021-09-16 RX ORDER — FAMOTIDINE 20 MG/1
20 TABLET, FILM COATED ORAL 2 TIMES DAILY
COMMUNITY

## 2021-09-16 NOTE — PROGRESS NOTES
"    Subjective:     Encounter Date:09/16/2021      Patient ID: Naun Brooks is a 81 y.o. male.    Chief Complaint:  History of Present Illness 81-year-old white male with history of coronary disease hypertension hyperlipidemia sleep apnea presents to my office for follow-up.  Patient is currently stable without any symptoms of chest pain or shortness of breath at rest on exertion.  No complains any PND orthopnea.  No palpitation dizziness syncope or swelling of the feet.  Patient has been taking all the medicines regularly patient does not smoke.  He is not able to use his CPAP regularly.  He does not sleep very well.  He is not able to exercise very well.    The following portions of the patient's history were reviewed and updated as appropriate: allergies, current medications, past family history, past medical history, past social history, past surgical history and problem list.  Past Medical History:   Diagnosis Date   • Angina pectoris (CMS/HCC)    • Arthritis    • Chronic vertigo 12/7/2016   • Coronary artery disease    • GERD (gastroesophageal reflux disease)    • Glaucoma    • Hyperlipidemia    • Hypertension    • Memory loss      Past Surgical History:   Procedure Laterality Date   • BRAIN SURGERY     • CENTRAL SHUNT     • COLONOSCOPY     • COLONOSCOPY N/A 10/18/2019    Procedure: COLONOSCOPY with polypectomy x 2 and fulgurate 1 polyp;  Surgeon: Kevin Cuevas MD;  Location: [facility] ENDOSCOPY;  Service: Gastroenterology   • EYE SURGERY     • HEMORRHOID BANDING     • JOINT REPLACEMENT      hip and knee   • PROSTATE SURGERY       /70 (BP Location: Left arm, Patient Position: Sitting)   Pulse 54   Ht 182.9 cm (72\")   Wt 102 kg (225 lb)   SpO2 94%   BMI 30.52 kg/m²   Family History   Problem Relation Age of Onset   • Heart disease Mother    • Stroke Father        Current Outpatient Medications:   •  aspirin 81 MG tablet, Take 81 mg by mouth Daily., Disp: , Rfl:   •  buPROPion XL (WELLBUTRIN " XL) 150 MG 24 hr tablet, Take 150 mg by mouth Daily., Disp: , Rfl:   •  Cyanocobalamin (B-12) 1000 MCG tablet controlled-release, Take 1 tablet by mouth Daily., Disp: , Rfl: 11  •  donepezil (ARICEPT) 23 MG tablet, Take 1 tablet by mouth Every Night., Disp: 90 tablet, Rfl: 3  •  dorzolamide-timolol (COSOPT) 22.3-6.8 MG/ML ophthalmic solution, Administer 1 drop to both eyes 2 (Two) Times a Day., Disp: , Rfl:   •  escitalopram (LEXAPRO) 20 MG tablet, Take 20 mg by mouth Daily., Disp: , Rfl:   •  famotidine (PEPCID) 20 MG tablet, Take 20 mg by mouth 2 (Two) Times a Day., Disp: , Rfl:   •  fluticasone (FLONASE) 50 MCG/ACT nasal spray, 2 sprays into the nostril(s) as directed by provider Daily., Disp: , Rfl:   •  gabapentin (NEURONTIN) 300 MG capsule, Take 300 mg by mouth 2 (Two) Times a Day., Disp: , Rfl:   •  Omega-3 Fatty Acids (EQL OMEGA 3 FISH OIL) 1400 MG capsule, Take 1,600 mg by mouth 2 (Two) Times a Day., Disp: , Rfl:   •  valsartan-hydrochlorothiazide (DIOVAN-HCT) 320-25 MG per tablet, Take 1 tablet by mouth 2 (Two) Times a Day., Disp: , Rfl:   Allergies   Allergen Reactions   • Dust Mite Extract Irritability   • Molds & Smuts Irritability   • Tree Extract Irritability     Social History     Socioeconomic History   • Marital status:      Spouse name: Not on file   • Number of children: Not on file   • Years of education: Not on file   • Highest education level: Not on file   Tobacco Use   • Smoking status: Former Smoker   • Smokeless tobacco: Never Used   Vaping Use   • Vaping Use: Never used   Substance and Sexual Activity   • Alcohol use: No   • Drug use: No   • Sexual activity: Yes     Partners: Female     Review of Systems   Constitutional: Negative for fever and malaise/fatigue.   Cardiovascular: Negative for chest pain, dyspnea on exertion and palpitations.   Respiratory: Negative for cough and shortness of breath.    Skin: Negative for rash.   Gastrointestinal: Negative for abdominal pain, nausea  and vomiting.   Neurological: Negative for focal weakness and headaches.   All other systems reviewed and are negative.             Objective:     Constitutional:       Appearance: Well-developed.   Eyes:      General: No scleral icterus.     Conjunctiva/sclera: Conjunctivae normal.   HENT:      Head: Normocephalic and atraumatic.   Neck:      Vascular: No carotid bruit or JVD.   Pulmonary:      Effort: Pulmonary effort is normal.      Breath sounds: Normal breath sounds. No wheezing. No rales.   Cardiovascular:      Normal rate. Regular rhythm.   Pulses:     Intact distal pulses.   Abdominal:      General: Bowel sounds are normal.      Palpations: Abdomen is soft.   Musculoskeletal:      Cervical back: Normal range of motion and neck supple. Skin:     General: Skin is warm and dry.      Findings: No rash.   Neurological:      Mental Status: Alert.       Procedures    Lab Review:         MDM  1.  Coronary disease  Patient has nonobstructive disease now with normal be systolic function is currently stable on medications  2.  Hypertension  Patient blood pressure currently stable on medical therapy  3.  Hyperlipidemia  Patient's lipid levels are followed by the primary care doctor.  4.  Sleep apnea  Patient sleep apnea uses a CPAP machine      Patient's previous medical records, labs, and EKG were reviewed and discussed with the patient at today's visit.

## 2021-11-17 ENCOUNTER — TELEPHONE (OUTPATIENT)
Dept: NEUROLOGY | Facility: CLINIC | Age: 81
End: 2021-11-17

## 2021-12-20 NOTE — TELEPHONE ENCOUNTER
Pts wife understanding he will continue to use.  They have spoken with Luis about getting a new one since his is 20 yrs old

## 2022-01-10 ENCOUNTER — OFFICE (AMBULATORY)
Dept: URBAN - METROPOLITAN AREA CLINIC 64 | Facility: CLINIC | Age: 82
End: 2022-01-10
Payer: MEDICARE

## 2022-01-10 VITALS — DIASTOLIC BLOOD PRESSURE: 93 MMHG | SYSTOLIC BLOOD PRESSURE: 170 MMHG | HEART RATE: 60 BPM | WEIGHT: 228 LBS

## 2022-01-10 DIAGNOSIS — K62.89 OTHER SPECIFIED DISEASES OF ANUS AND RECTUM: ICD-10-CM

## 2022-01-10 PROCEDURE — 99214 OFFICE O/P EST MOD 30 MIN: CPT | Performed by: NURSE PRACTITIONER

## 2022-01-10 RX ORDER — HYDROCORTISONE ACETATE PRAMOXINE HCL 2.5; 1 G/100G; G/100G
CREAM TOPICAL
Qty: 30 | Refills: 1 | Status: ACTIVE
Start: 2022-01-10

## 2022-01-13 ENCOUNTER — ON CAMPUS - OUTPATIENT (AMBULATORY)
Dept: URBAN - METROPOLITAN AREA HOSPITAL 2 | Facility: HOSPITAL | Age: 82
End: 2022-01-13
Payer: MEDICARE

## 2022-01-13 ENCOUNTER — OFFICE (AMBULATORY)
Dept: URBAN - METROPOLITAN AREA PATHOLOGY 4 | Facility: PATHOLOGY | Age: 82
End: 2022-01-13
Payer: MEDICARE

## 2022-01-13 ENCOUNTER — OFFICE (AMBULATORY)
Dept: URBAN - METROPOLITAN AREA PATHOLOGY 4 | Facility: PATHOLOGY | Age: 82
End: 2022-01-13
Payer: COMMERCIAL

## 2022-01-13 VITALS
DIASTOLIC BLOOD PRESSURE: 60 MMHG | HEART RATE: 62 BPM | DIASTOLIC BLOOD PRESSURE: 62 MMHG | HEART RATE: 53 BPM | SYSTOLIC BLOOD PRESSURE: 132 MMHG | DIASTOLIC BLOOD PRESSURE: 73 MMHG | WEIGHT: 222 LBS | DIASTOLIC BLOOD PRESSURE: 69 MMHG | RESPIRATION RATE: 16 BRPM | DIASTOLIC BLOOD PRESSURE: 53 MMHG | SYSTOLIC BLOOD PRESSURE: 89 MMHG | OXYGEN SATURATION: 97 % | TEMPERATURE: 97.2 F | HEART RATE: 78 BPM | OXYGEN SATURATION: 96 % | SYSTOLIC BLOOD PRESSURE: 142 MMHG | SYSTOLIC BLOOD PRESSURE: 91 MMHG | DIASTOLIC BLOOD PRESSURE: 58 MMHG | SYSTOLIC BLOOD PRESSURE: 123 MMHG | OXYGEN SATURATION: 95 % | HEART RATE: 63 BPM | HEART RATE: 61 BPM | SYSTOLIC BLOOD PRESSURE: 90 MMHG | SYSTOLIC BLOOD PRESSURE: 107 MMHG | OXYGEN SATURATION: 93 % | DIASTOLIC BLOOD PRESSURE: 51 MMHG | HEART RATE: 60 BPM | RESPIRATION RATE: 17 BRPM | SYSTOLIC BLOOD PRESSURE: 100 MMHG | OXYGEN SATURATION: 94 % | SYSTOLIC BLOOD PRESSURE: 113 MMHG | HEART RATE: 58 BPM | DIASTOLIC BLOOD PRESSURE: 63 MMHG

## 2022-01-13 DIAGNOSIS — K57.30 DIVERTICULOSIS OF LARGE INTESTINE WITHOUT PERFORATION OR ABS: ICD-10-CM

## 2022-01-13 DIAGNOSIS — D12.2 BENIGN NEOPLASM OF ASCENDING COLON: ICD-10-CM

## 2022-01-13 DIAGNOSIS — D12.4 BENIGN NEOPLASM OF DESCENDING COLON: ICD-10-CM

## 2022-01-13 DIAGNOSIS — K62.1 RECTAL POLYP: ICD-10-CM

## 2022-01-13 DIAGNOSIS — K64.1 SECOND DEGREE HEMORRHOIDS: ICD-10-CM

## 2022-01-13 DIAGNOSIS — Z86.010 PERSONAL HISTORY OF COLONIC POLYPS: ICD-10-CM

## 2022-01-13 PROBLEM — K63.5 POLYP OF COLON: Status: ACTIVE | Noted: 2022-01-13

## 2022-01-13 LAB
GI HISTOLOGY: A. UNSPECIFIED: (no result)
GI HISTOLOGY: B. UNSPECIFIED: (no result)
GI HISTOLOGY: PDF REPORT: (no result)

## 2022-01-13 PROCEDURE — 45385 COLONOSCOPY W/LESION REMOVAL: CPT | Mod: PT | Performed by: INTERNAL MEDICINE

## 2022-01-13 PROCEDURE — 88305 TISSUE EXAM BY PATHOLOGIST: CPT | Mod: 26 | Performed by: INTERNAL MEDICINE

## 2022-01-13 PROCEDURE — 45380 COLONOSCOPY AND BIOPSY: CPT | Mod: 59,PT | Performed by: INTERNAL MEDICINE

## 2022-01-13 PROCEDURE — 45380 COLONOSCOPY AND BIOPSY: CPT | Mod: PT,59 | Performed by: INTERNAL MEDICINE

## 2022-04-28 ENCOUNTER — OFFICE VISIT (OUTPATIENT)
Dept: CARDIOLOGY | Facility: CLINIC | Age: 82
End: 2022-04-28

## 2022-04-28 VITALS
WEIGHT: 225 LBS | OXYGEN SATURATION: 95 % | DIASTOLIC BLOOD PRESSURE: 72 MMHG | SYSTOLIC BLOOD PRESSURE: 155 MMHG | HEART RATE: 55 BPM | HEIGHT: 72 IN | BODY MASS INDEX: 30.48 KG/M2

## 2022-04-28 DIAGNOSIS — I10 ESSENTIAL HYPERTENSION: ICD-10-CM

## 2022-04-28 DIAGNOSIS — E78.00 PURE HYPERCHOLESTEROLEMIA: ICD-10-CM

## 2022-04-28 DIAGNOSIS — G47.33 SLEEP APNEA, OBSTRUCTIVE: ICD-10-CM

## 2022-04-28 DIAGNOSIS — I25.118 CORONARY ARTERY DISEASE OF NATIVE ARTERY OF NATIVE HEART WITH STABLE ANGINA PECTORIS: Primary | ICD-10-CM

## 2022-04-28 PROCEDURE — 99214 OFFICE O/P EST MOD 30 MIN: CPT | Performed by: INTERNAL MEDICINE

## 2022-04-28 RX ORDER — MULTIVIT WITH MINERALS/LUTEIN
1000 TABLET ORAL DAILY
COMMUNITY

## 2022-04-28 RX ORDER — MULTIPLE VITAMINS W/ MINERALS TAB 9MG-400MCG
1 TAB ORAL DAILY
COMMUNITY
End: 2022-12-29

## 2022-04-28 NOTE — PROGRESS NOTES
"    Subjective:     Encounter Date:04/28/2022      Patient ID: Naun Brooks is a 81 y.o. male.    Chief Complaint:  History of Present Illness 81-year-old white male with history of coronary disease hypertension hyperlipidemia and sleep apnea presents to my office for follow-up.  Patient is currently stable without any symptoms of chest pain or shortness of breath at rest or exertion.  No complains any PND orthopnea.  No palpitation dizziness syncope or swelling of the feet but is been take his medicines regularly but he does not smoke but is trying to exercise regular.  He uses a CPAP machine regularly.    The following portions of the patient's history were reviewed and updated as appropriate: allergies, current medications, past family history, past medical history, past social history, past surgical history and problem list.  Past Medical History:   Diagnosis Date   • Angina pectoris (HCC)    • Arthritis    • Chronic vertigo 12/7/2016   • Coronary artery disease    • GERD (gastroesophageal reflux disease)    • Glaucoma    • Hyperlipidemia    • Hypertension    • Memory loss      Past Surgical History:   Procedure Laterality Date   • BRAIN SURGERY     • CENTRAL SHUNT     • COLONOSCOPY     • COLONOSCOPY N/A 10/18/2019    Procedure: COLONOSCOPY with polypectomy x 2 and fulgurate 1 polyp;  Surgeon: Kevin Cuevas MD;  Location: [facility] ENDOSCOPY;  Service: Gastroenterology   • EYE SURGERY     • HEMORRHOID BANDING     • JOINT REPLACEMENT      hip and knee   • PROSTATE SURGERY       /72 (BP Location: Left arm, Patient Position: Sitting)   Pulse 55   Ht 182.9 cm (72\")   Wt 102 kg (225 lb)   SpO2 95%   BMI 30.52 kg/m²   Family History   Problem Relation Age of Onset   • Heart disease Mother    • Stroke Father        Current Outpatient Medications:   •  ascorbic acid (VITAMIN C) 1000 MG tablet, Take 1,000 mg by mouth Daily., Disp: , Rfl:   •  aspirin 81 MG tablet, Take 81 mg by mouth Daily., Disp: , " Rfl:   •  buPROPion XL (WELLBUTRIN XL) 150 MG 24 hr tablet, Take 150 mg by mouth Daily., Disp: , Rfl:   •  Cyanocobalamin (B-12) 1000 MCG tablet controlled-release, Take 1 tablet by mouth Daily., Disp: , Rfl: 11  •  dorzolamide-timolol (COSOPT) 22.3-6.8 MG/ML ophthalmic solution, Administer 1 drop to both eyes 2 (Two) Times a Day., Disp: , Rfl:   •  escitalopram (LEXAPRO) 20 MG tablet, Take 20 mg by mouth Daily., Disp: , Rfl:   •  famotidine (PEPCID) 20 MG tablet, Take 20 mg by mouth 2 (Two) Times a Day., Disp: , Rfl:   •  gabapentin (NEURONTIN) 300 MG capsule, Take 300 mg by mouth 2 (Two) Times a Day., Disp: , Rfl:   •  multivitamin with minerals (VITAMIN D3 COMPLETE PO), Take 1 tablet by mouth Daily., Disp: , Rfl:   •  Omega-3 Fatty Acids (EQL OMEGA 3 FISH OIL) 1400 MG capsule, Take 1,600 mg by mouth 2 (Two) Times a Day., Disp: , Rfl:   •  valsartan-hydrochlorothiazide (DIOVAN-HCT) 160-12.5 MG per tablet, Take 1 tablet by mouth 2 (Two) Times a Day., Disp: , Rfl:   •  fluticasone (FLONASE) 50 MCG/ACT nasal spray, 2 sprays into the nostril(s) as directed by provider Daily., Disp: , Rfl:   Allergies   Allergen Reactions   • Dust Mite Extract Irritability   • Molds & Smuts Irritability   • Tree Extract Irritability     Social History     Socioeconomic History   • Marital status:    Tobacco Use   • Smoking status: Former Smoker   • Smokeless tobacco: Never Used   Vaping Use   • Vaping Use: Never used   Substance and Sexual Activity   • Alcohol use: No   • Drug use: No   • Sexual activity: Yes     Partners: Female     Review of Systems   Constitutional: Negative for fever and malaise/fatigue.   Cardiovascular: Negative for chest pain, dyspnea on exertion and palpitations.   Respiratory: Negative for cough and shortness of breath.    Skin: Negative for rash.   Gastrointestinal: Negative for abdominal pain, nausea and vomiting.   Neurological: Negative for focal weakness and headaches.   All other systems reviewed  and are negative.             Objective:     Constitutional:       Appearance: Well-developed.   Eyes:      General: No scleral icterus.     Conjunctiva/sclera: Conjunctivae normal.   HENT:      Head: Normocephalic and atraumatic.   Neck:      Vascular: No carotid bruit or JVD.   Pulmonary:      Effort: Pulmonary effort is normal.      Breath sounds: Normal breath sounds. No wheezing. No rales.   Cardiovascular:      Normal rate. Regular rhythm.   Pulses:     Intact distal pulses.   Abdominal:      General: Bowel sounds are normal.      Palpations: Abdomen is soft.   Musculoskeletal:      Cervical back: Normal range of motion and neck supple. Skin:     General: Skin is warm and dry.      Findings: No rash.   Neurological:      Mental Status: Alert.       Procedures    Lab Review:         MDM   1.  Coronary disease  Patient has nonobstructive disease now with normal V systolic function is currently stable on medications  2.  Hypertension  Patient blood pressure currently stable on medications including valsartan  3.  Hyperlipidemia  Patient is on over-the-counter medicines and followed by the primary care doctor    #4 sleep apnea  Patient has sleep apnea and uses a CPAP machine    Patient's previous medical records, labs, and EKG were reviewed and discussed with the patient at today's visit.

## 2022-07-12 ENCOUNTER — TELEPHONE (OUTPATIENT)
Dept: CARDIOLOGY | Facility: CLINIC | Age: 82
End: 2022-07-12

## 2022-07-12 NOTE — TELEPHONE ENCOUNTER
[facility]  Dr. Terry Hartley  Right total knee arthroplasty  Scheduled ASAP  Phone# [phone]  Fax# [phone]              Faxed back for physical signature

## 2022-08-01 ENCOUNTER — TELEPHONE (OUTPATIENT)
Dept: CARDIOLOGY | Facility: CLINIC | Age: 82
End: 2022-08-01

## 2022-08-01 NOTE — TELEPHONE ENCOUNTER
DR. MOSHER [facility]  PERIODONTAL SURGERY  SURGERY TBD  PHONE [phone]  [phone]    PLACED ON DR. CAROLYN GREENFIELD DESK.

## 2022-11-15 ENCOUNTER — TELEPHONE (OUTPATIENT)
Dept: NEUROLOGY | Facility: CLINIC | Age: 82
End: 2022-11-15

## 2022-11-15 NOTE — TELEPHONE ENCOUNTER
PATIENT NEEDS SCRIPT FOR NEW CPAP.     PATIENT HAS SCHEDULED A F/U APPT FOR 5/2023.  CAN SCRIPT BE WRITTEN BEFORE THIS APPT DATE AND LET THIS APPT STAND AS FOLLOW UP FOR NEW MACHINE?    PLEASE ADVISE.    THANK YOU

## 2022-11-17 ENCOUNTER — TELEPHONE (OUTPATIENT)
Dept: NEUROLOGY | Facility: CLINIC | Age: 82
End: 2022-11-17

## 2022-11-17 DIAGNOSIS — G47.33 OBSTRUCTIVE SLEEP APNEA: Primary | ICD-10-CM

## 2022-12-29 ENCOUNTER — OFFICE VISIT (OUTPATIENT)
Dept: CARDIOLOGY | Facility: CLINIC | Age: 82
End: 2022-12-29

## 2022-12-29 VITALS
DIASTOLIC BLOOD PRESSURE: 69 MMHG | HEART RATE: 63 BPM | SYSTOLIC BLOOD PRESSURE: 109 MMHG | OXYGEN SATURATION: 95 % | BODY MASS INDEX: 27.77 KG/M2 | HEIGHT: 72 IN | WEIGHT: 205 LBS

## 2022-12-29 DIAGNOSIS — G47.33 SLEEP APNEA, OBSTRUCTIVE: ICD-10-CM

## 2022-12-29 DIAGNOSIS — I10 ESSENTIAL HYPERTENSION: ICD-10-CM

## 2022-12-29 DIAGNOSIS — I25.118 CORONARY ARTERY DISEASE OF NATIVE ARTERY OF NATIVE HEART WITH STABLE ANGINA PECTORIS: Primary | ICD-10-CM

## 2022-12-29 DIAGNOSIS — E78.00 PURE HYPERCHOLESTEROLEMIA: ICD-10-CM

## 2022-12-29 PROCEDURE — 99214 OFFICE O/P EST MOD 30 MIN: CPT | Performed by: INTERNAL MEDICINE

## 2022-12-29 NOTE — PROGRESS NOTES
"        Subjective:     Encounter Date:12/29/2022      Patient ID: Naun Brooks is a 82 y.o. male.    Chief Complaint:  History of Present Illness 82-year-old weight male with history of coronary disease hypertension hyperlipidemia sleep apnea presents to my office for follow-up.  Patient is currently stable without any symptoms of chest pain or shortness of breath at rest on exertion.  No complains any PND orthopnea.  No palpitation dizziness syncope or swelling of the feet but is taking meds regular.  He does not smoke.  Exercise regularly follows a good diet.    The following portions of the patient's history were reviewed and updated as appropriate: allergies, current medications, past family history, past medical history, past social history, past surgical history and problem list.  Past Medical History:   Diagnosis Date   • Angina pectoris (HCC)    • Arthritis    • Chronic vertigo 12/7/2016   • Coronary artery disease    • GERD (gastroesophageal reflux disease)    • Glaucoma    • Hyperlipidemia    • Hypertension    • Memory loss      Past Surgical History:   Procedure Laterality Date   • BRAIN SURGERY     • CENTRAL SHUNT     • COLONOSCOPY     • COLONOSCOPY N/A 10/18/2019    Procedure: COLONOSCOPY with polypectomy x 2 and fulgurate 1 polyp;  Surgeon: Kevin Cuevas MD;  Location: [facility] ENDOS[facility];  Service: Gastroenterology   • EYE SURGERY     • HEMORRHOID BANDING     • JOINT REPLACEMENT      hip and knee   • PROSTATE SURGERY       /69   Pulse 63   Ht 182.9 cm (72\")   Wt 93 kg (205 lb)   SpO2 95%   BMI 27.80 kg/m²   Family History   Problem Relation Age of Onset   • Heart disease Mother    • Stroke Father        Current Outpatient Medications:   •  ascorbic acid (VITAMIN C) 1000 MG tablet, Take 1,000 mg by mouth Daily., Disp: , Rfl:   •  aspirin 81 MG tablet, Take 81 mg by mouth Daily., Disp: , Rfl:   •  buPROPion XL (WELLBUTRIN XL) 150 MG 24 hr tablet, Take 150 mg by mouth Daily., Disp: , " Rfl:   •  Cyanocobalamin (B-12) 1000 MCG tablet controlled-release, Take 1 tablet by mouth Daily., Disp: , Rfl: 11  •  dorzolamide-timolol (COSOPT) 22.3-6.8 MG/ML ophthalmic solution, Administer 1 drop to both eyes 2 (Two) Times a Day., Disp: , Rfl:   •  escitalopram (LEXAPRO) 20 MG tablet, Take 20 mg by mouth Daily., Disp: , Rfl:   •  famotidine (PEPCID) 20 MG tablet, Take 20 mg by mouth 2 (Two) Times a Day., Disp: , Rfl:   •  fluticasone (FLONASE) 50 MCG/ACT nasal spray, 2 sprays into the nostril(s) as directed by provider Daily., Disp: , Rfl:   •  gabapentin (NEURONTIN) 300 MG capsule, Take 300 mg by mouth 3 (Three) Times a Day., Disp: , Rfl:   •  Omega-3 Fatty Acids (EQL OMEGA 3 FISH OIL) 1400 MG capsule, Take 1,600 mg by mouth 2 (Two) Times a Day., Disp: , Rfl:   •  valsartan-hydrochlorothiazide (DIOVAN-HCT) 160-12.5 MG per tablet, Take 1 tablet by mouth 2 (Two) Times a Day., Disp: , Rfl:   Allergies   Allergen Reactions   • Dust Mite Extract Irritability   • Molds & Smuts Irritability   • Tree Extract Irritability     Social History     Socioeconomic History   • Marital status:    Tobacco Use   • Smoking status: Former   • Smokeless tobacco: Never   Vaping Use   • Vaping Use: Never used   Substance and Sexual Activity   • Alcohol use: No   • Drug use: No   • Sexual activity: Yes     Partners: Female     Review of Systems   Constitutional: Negative for malaise/fatigue.   Cardiovascular: Negative for chest pain, dyspnea on exertion, leg swelling and palpitations.   Respiratory: Negative for cough and shortness of breath.    Gastrointestinal: Negative for abdominal pain, nausea and vomiting.   Neurological: Negative for dizziness, focal weakness, headaches, light-headedness and numbness.   All other systems reviewed and are negative.             Objective:     Constitutional:       Appearance: Well-developed.   Eyes:      General: No scleral icterus.     Conjunctiva/sclera: Conjunctivae normal.   HENT:       Head: Normocephalic and atraumatic.   Neck:      Vascular: No carotid bruit or JVD.   Pulmonary:      Effort: Pulmonary effort is normal.      Breath sounds: Normal breath sounds. No wheezing. No rales.   Cardiovascular:      Normal rate. Regular rhythm.   Pulses:     Intact distal pulses.   Abdominal:      General: Bowel sounds are normal.      Palpations: Abdomen is soft.   Musculoskeletal:      Cervical back: Normal range of motion and neck supple. Skin:     General: Skin is warm and dry.      Findings: No rash.   Neurological:      Mental Status: Alert.       Procedures    Lab Review:         MDM  1 coronary disease  Patient has nonobstructive disease and is currently stable on medications including aspirin and valsartan  2.  Hypertension  Patient blood pressure currently stable on medications  3.  Hyperlipidemia  Patient is on omega-3 fish oil only and not on statins and followed by the primary care doctor  4.  Sleep apnea  Patient is sleep apnea and uses a CPAP machine    Patient's previous medical records, labs, and EKG were reviewed and discussed with the patient at today's visit.

## 2023-02-08 ENCOUNTER — TELEPHONE (OUTPATIENT)
Dept: NEUROLOGY | Facility: CLINIC | Age: 83
End: 2023-02-08

## 2023-02-08 NOTE — TELEPHONE ENCOUNTER
Provider: SEIPEL  Caller: GWEN MELCHOR  Relationship to Patient: SPOUSE  Phone Number: [phone]  Reason for Call: PATIENT'S SPOUSE CALLED REGARDING PATIENT'S CPAP MACHINE.  PATIENT'S CPAP MACHINE WAS RECALLED.  THEY HAVE BEEN WAITING FOR A REPLACEMENT AND [facility] CALLED THE PATIENT SAID THEY DID NOT HAVE A RX FOR ONE.  PATIENT HAS BEEN USING THE RECALLED MACHINE.   PLEASE CALL PATIENT'S SPOUSE TO ADVISE.  [facility] GAVE THE PATIENT SEVERAL CHOICES AND THEY ARE NOT SURE WHICH ONE TO CHOOSE.    PLEASE REVIEW AND ADVISE     THANK YOU

## 2023-02-14 NOTE — TELEPHONE ENCOUNTER
SPOKE TO WIFE AT LENGTH ABOUT RECALLED PAPS. SHE SAID PT CURRENT PAP IS 18YRS OLD. SHE ALSO STATED HE WAS SNORING WITH MASK ON. I ASK HER TO BRING CHIP IN SO WE CAN RUN REPORT. THEY WILL BE HERE IN MAY TO SEE DR. SEIPEL. WILL ORDER NEW PAP AT THAT TIME.

## 2023-05-11 NOTE — PROGRESS NOTES
"Chief Complaint  Sleep Apnea    Subjective          Naun Brooks presents to [facility]UR[facility]  History of Present Illness  Yearly f/u for cpap compliance, pt. doing well with pap therapy.  pt. states sleeps fair uses FFM and goes through [facility] for supplies.    Sleep testing history:    On NPSG at [facility] , 05/07/2006 patient had Severe obstructive sleep apnea syndrome with apnea-hypopnea index of 79 per sleep hour, minimum SpO2 of 85%    PAP download:  The patient is on CPAP therapy at 18 cm/H2O.   Data indicates Excellent compliance. With 100% usage for more than 4 hours with an average usage of 7 hours 43 minutes. AHI down to 12.8 .  Average pressures 18.  Average large leak 14.     The patient's hypersomnia has worsened       [place] Sleepiness Scale:  Sitting and reading 2 WatchingTV 3  Sitting, inactive, in a public place 3  As a passenger in a car for 1 hour w/o a break  3  Lying down to rest in the afternoon  3  Sitting and talking to someone  1  Sitting quietly after a lunch  3  In a car, while stopped for traffic or a light  0  Total 18      Review of Systems   Respiratory: Positive for apnea.    Musculoskeletal: Positive for back pain and gait problem.   All other systems reviewed and are negative.        Objective   Vital Signs:   /71   Pulse 56   Resp 18   Ht 182.9 cm (72\")   Wt 93 kg (205 lb)   BMI 27.80 kg/m²     Physical Exam  Vitals reviewed.   Pulmonary:      Effort: Pulmonary effort is normal.   Neurological:      Mental Status: He is alert and oriented to person, place, and time.   Psychiatric:         Mood and Affect: Mood normal.        Result Review :                 Assessment and Plan    Diagnoses and all orders for this visit:    1. Sleep apnea, obstructive (Primary)      Pt is not rested with hypersomnolence during the day  The patient is compliant with and benefiting from PAP therapy.  HOWEVER the ahi is 12 times per hour, machine is over 10 years old    Will " order split night study for possible bipap.      Follow Up   Return in about 1 year (around 5/15/2024).    Patient was given instructions and counseling regarding his condition or for health maintenance advice. Please see specific information pulled into the AVS if appropriate.       This document has been electronically signed by Joseph Seipel, MD on May 15, 2023 09:18 EDT

## 2023-05-15 ENCOUNTER — OFFICE VISIT (OUTPATIENT)
Dept: NEUROLOGY | Facility: CLINIC | Age: 83
End: 2023-05-15
Payer: MEDICARE

## 2023-05-15 VITALS
RESPIRATION RATE: 18 BRPM | SYSTOLIC BLOOD PRESSURE: 117 MMHG | HEART RATE: 56 BPM | BODY MASS INDEX: 27.77 KG/M2 | HEIGHT: 72 IN | WEIGHT: 205 LBS | DIASTOLIC BLOOD PRESSURE: 71 MMHG

## 2023-05-15 DIAGNOSIS — G47.33 SLEEP APNEA, OBSTRUCTIVE: Primary | ICD-10-CM

## 2023-05-15 PROCEDURE — 3078F DIAST BP <80 MM HG: CPT | Performed by: PSYCHIATRY & NEUROLOGY

## 2023-05-15 PROCEDURE — 1160F RVW MEDS BY RX/DR IN RCRD: CPT | Performed by: PSYCHIATRY & NEUROLOGY

## 2023-05-15 PROCEDURE — 99213 OFFICE O/P EST LOW 20 MIN: CPT | Performed by: PSYCHIATRY & NEUROLOGY

## 2023-05-15 PROCEDURE — 1159F MED LIST DOCD IN RCRD: CPT | Performed by: PSYCHIATRY & NEUROLOGY

## 2023-05-15 PROCEDURE — 3074F SYST BP LT 130 MM HG: CPT | Performed by: PSYCHIATRY & NEUROLOGY

## 2023-07-24 ENCOUNTER — HOSPITAL ENCOUNTER (OUTPATIENT)
Dept: SLEEP MEDICINE | Facility: HOSPITAL | Age: 83
Discharge: HOME | End: 2023-07-24
Admitting: PSYCHIATRY & NEUROLOGY
Payer: MEDICARE

## 2023-07-24 VITALS — WEIGHT: 212.52 LBS | HEIGHT: 72 IN | BODY MASS INDEX: 28.79 KG/M2

## 2023-07-24 DIAGNOSIS — G47.33 SLEEP APNEA, OBSTRUCTIVE: ICD-10-CM

## 2023-07-24 PROCEDURE — 95811 POLYSOM 6/>YRS CPAP 4/> PARM: CPT

## 2023-07-24 PROCEDURE — 95811 POLYSOM 6/>YRS CPAP 4/> PARM: CPT | Performed by: PSYCHIATRY & NEUROLOGY

## 2023-08-01 ENCOUNTER — TELEPHONE (OUTPATIENT)
Dept: NEUROLOGY | Facility: CLINIC | Age: 83
End: 2023-08-01
Payer: MEDICARE

## 2023-08-01 DIAGNOSIS — G47.33 SLEEP APNEA, OBSTRUCTIVE: Primary | ICD-10-CM

## 2023-08-01 NOTE — TELEPHONE ENCOUNTER
----- Message from Joseph F Seipel, MD sent at 7/25/2023  5:57 PM EDT -----  Split-night study completed    Set up on auto CPAP minimum 12 maximum 22, pressure support minimum 4 maximum 6

## 2023-08-07 NOTE — TELEPHONE ENCOUNTER
Pts wife aware of results. Orders to [facility] as requested. Pending signature for orders

## 2023-08-07 NOTE — TELEPHONE ENCOUNTER
Caller: Hedy Brooks    Relationship: Emergency Contact    Best call back number: [phone]    Caller requesting test results: PATIENT'S WIFE    What test was performed: SLEEP APNEA TEST    When was the test performed: 7/25/23    Where was the test performed:     Additional notes: PATIENTS WIFE TELEPHONED TO REQUEST SLEEP APNEA RESULTS & SETTINGS FOR PATIENTS BI PAP MACHINE. PLEASE CALL & ADVISE-THANK YOU

## 2023-08-11 ENCOUNTER — TELEPHONE (OUTPATIENT)
Dept: NEUROLOGY | Facility: CLINIC | Age: 83
End: 2023-08-11
Payer: MEDICARE

## 2023-08-11 NOTE — TELEPHONE ENCOUNTER
Caller: BILL    Relationship: RAI Hawkins call back number: [phone]  [phone]    What test was performed: SLEEP STUDY    When was the test performed: 7-24-23    Additional notes:   PT IS DUE FOR A NEW MACHINE. PLEASE FAX SLEEP STUDY TEST RESULTS.

## 2023-09-07 NOTE — PROGRESS NOTES
EMG and Nerve Conduction Studies    Patient Name: Naun Brooks    Date of Study:9/8/23    Referring Provider:DR. HARRY    History:    This patient has history of chronic low back pain with history of lumbar spine surgery.    Results:    The complete report includes the data sheets.     Prior to starting the procedure, the patient's identity was verified, pertinent available records were reviewed, the nature of the procedure was explained, the appropriate site of the exam were confirmed directly with the patient, and a pre-procedure pause was performed for final verification of all the above.    1.  The right sural sensory nerve conduction study was normal.  The right tibial and right peroneal motor distal latencies were normal and conduction velocity from knee to ankle was essentially normal however the amplitude of the compound muscle action potentials were significantly reduced indicating axonal loss.    2.  The vastus lateralis muscle and gastrocnemius muscles were essentially normal.    3.  The tibialis anterior peroneus longus and extensor digitorum brevis muscles showed increased insertional activity fibrillations and decreased recruitment.  These muscles showed increased duration and increased amplitude motor units as well as some polyphasic motor units with decreased recruitment.      Impression:    This is an abnormal study.  There is evidence of neurogenic change both acute and chronic predominantly in the right L5 myotome.      Electronically signed by :    Joseph Seipel, M.D.  September 11, 2023

## 2023-09-08 ENCOUNTER — PROCEDURE VISIT (OUTPATIENT)
Dept: NEUROLOGY | Facility: CLINIC | Age: 83
End: 2023-09-08
Payer: MEDICARE

## 2023-09-08 VITALS
HEIGHT: 72 IN | WEIGHT: 212 LBS | HEART RATE: 65 BPM | SYSTOLIC BLOOD PRESSURE: 124 MMHG | BODY MASS INDEX: 28.71 KG/M2 | DIASTOLIC BLOOD PRESSURE: 69 MMHG

## 2023-09-08 DIAGNOSIS — M54.50 CHRONIC LOW BACK PAIN, UNSPECIFIED BACK PAIN LATERALITY, UNSPECIFIED WHETHER SCIATICA PRESENT: Primary | ICD-10-CM

## 2023-09-08 DIAGNOSIS — G89.29 CHRONIC LOW BACK PAIN, UNSPECIFIED BACK PAIN LATERALITY, UNSPECIFIED WHETHER SCIATICA PRESENT: Primary | ICD-10-CM

## 2023-09-08 DIAGNOSIS — M96.1 LUMBAR POST-LAMINECTOMY SYNDROME: ICD-10-CM

## 2023-12-14 ENCOUNTER — TELEPHONE (OUTPATIENT)
Dept: NEUROLOGY | Facility: OTHER | Age: 83
End: 2023-12-14
Payer: MEDICARE

## 2023-12-14 NOTE — TELEPHONE ENCOUNTER
Provider: DR. SEIPEL    Caller: ANGI MELCHOR    Relationship to Patient: SELF    Pharmacy: N/A    Phone Number: [phone]    Reason for Call: PER JUSTICE ARRINGTON, CALLED TO CANCEL 11:30 APPOINTMENT TIME ON 12/19/2023 AND ADD SLEEP FOLLOW UP TO APPOINTMENT TIME 1:00 FOR THE SAME DATE.    LVM FOR PATIENT TO RETURN CALL.

## 2023-12-18 NOTE — PROGRESS NOTES
Chief Complaint  Peripheral Neuropathy and Sleep Apnea    Subjective            Naun Brooks presents to [facility] for NEUROPATHY  History of Present Illness  New patient by Dr. Lee for neuropathy. ( Patient currently see Dr. Seipel for patricia only)    Onset: Over 5 years  Location: right leg only, epidural helped with the pain last time or two.  Gait: walks with a flores  Medication: Injections, and Gabapentin  Symptoms: no numbness at the moment due to the injection/  The ablation procedure did not help.   Saw dr reyna and had one back  surgery, had pain relived by the surgery.   Recent EMG showed right L5 radiculopathy   ===EMG RLE 9/8/23====  Impression:   This is an abnormal study.  There is evidence of neurogenic change both acute and chronic predominantly in the right L5 myotome.  Mri 2017 after back surgery  L4-L5: There is disc desiccation with anterior and lateral osteophytes a mild  disc bulge which is stable.  Posterior elements are intact.  There is mild  facet arthropathy and ligamentum flavum thickening.  These all combine to  cause moderate to severe right and moderate left neural foraminal narrowing.  There is no significant central canal narrowing.       DME [facility] [place]  Patient had Split Night 7/24/2023 AHI 31.8 Lowest O2 88%  Bipap 21/11 ps 4 ahi 17.5avg pressure 19/15 avg use 7hr 27min,     EPWORTH SLEEPINESS SCALE  Sitting and reading  3  WatchingTV  3  Sitting, inactive, in a public place  1  As a passenger in a car for 1 hour w/o a break  3  Lying down to rest in the afternoon  3  Sitting and talking to someone  2  Sitting quietly after a lunch  3  In a car, while stopped for traffic or a light  0  Total 18           Family History   Problem Relation Age of Onset    Heart disease Mother     Stroke Father        Past Medical History:   Diagnosis Date    Angina pectoris     Arthritis     Chronic vertigo 12/7/2016    Coronary artery disease     GERD (gastroesophageal  reflux disease)     Glaucoma     Hyperlipidemia     Hypertension     Memory loss        Social History     Socioeconomic History    Marital status:    Tobacco Use    Smoking status: Former    Smokeless tobacco: Never   Vaping Use    Vaping Use: Never used   Substance and Sexual Activity    Alcohol use: No    Drug use: No    Sexual activity: Yes     Partners: Female         Current Outpatient Medications:     ascorbic acid (VITAMIN C) 1000 MG tablet, Take 1 tablet by mouth Daily., Disp: , Rfl:     aspirin 81 MG tablet, Take 1 tablet by mouth Daily., Disp: , Rfl:     buPROPion XL (WELLBUTRIN XL) 150 MG 24 hr tablet, Take 1 tablet by mouth Daily., Disp: , Rfl:     escitalopram (LEXAPRO) 20 MG tablet, Take 1 tablet by mouth Daily., Disp: , Rfl:     famotidine (PEPCID) 20 MG tablet, Take 1 tablet by mouth 2 (Two) Times a Day., Disp: , Rfl:     gabapentin (NEURONTIN) 300 MG capsule, Take 1 capsule by mouth 3 (Three) Times a Day., Disp: , Rfl:     Omega-3 Fatty Acids (EQL OMEGA 3 FISH OIL) 1400 MG capsule, Take 1,600 mg by mouth 2 (Two) Times a Day., Disp: , Rfl:     raNITIdine (ZANTAC) 150 MG capsule, Take 1 capsule twice a day by oral route., Disp: , Rfl:     valsartan-hydrochlorothiazide (DIOVAN-HCT) 160-12.5 MG per tablet, Take 1 tablet by mouth 2 (Two) Times a Day., Disp: , Rfl:     chlorhexidine (PERIDEX) 0.12 % solution, SWISH AND SPIT 15 ML BY MOUTH TWICE A DAY (Patient not taking: Reported on 12/19/2023), Disp: , Rfl:     Cyanocobalamin (B-12) 1000 MCG tablet controlled-release, Take 1 tablet by mouth Daily. (Patient not taking: Reported on 12/19/2023), Disp: , Rfl: 11    dorzolamide-timolol (COSOPT) 22.3-6.8 MG/ML ophthalmic solution, Administer 1 drop to both eyes 2 (Two) Times a Day. (Patient not taking: Reported on 12/19/2023), Disp: , Rfl:     fluticasone (FLONASE) 50 MCG/ACT nasal spray, 2 sprays into the nostril(s) as directed by provider Daily. (Patient not taking: Reported on 12/19/2023), Disp: ,  "Rfl:     ibuprofen (ADVIL,MOTRIN) 600 MG tablet, Take 1 tablet by mouth 3 (Three) Times a Day As Needed. (Patient not taking: Reported on 12/19/2023), Disp: , Rfl:     metroNIDAZOLE (METROGEL) 1 % gel, APPLY TO ENTIRE NOSE DAILY. AVOID CONTACT W/ EYES. (Patient not taking: Reported on 12/19/2023), Disp: , Rfl:     Review of Systems   Eyes:  Positive for itching.   Respiratory:  Positive for cough.    Musculoskeletal:  Positive for gait problem.   Allergic/Immunologic: Positive for environmental allergies.   Neurological:  Positive for speech difficulty and light-headedness.   Hematological:  Bruises/bleeds easily.   Psychiatric/Behavioral:  Positive for confusion.    All other systems reviewed and are negative.           Objective   Vital Signs:   Ht 182.9 cm (72.01\")   Wt 92.1 kg (203 lb)   BMI 27.52 kg/m²     Physical Exam  Vitals reviewed.   HENT:      Head: Normocephalic.   Eyes:      Conjunctiva/sclera: Conjunctivae normal.   Cardiovascular:      Rate and Rhythm: Normal rate.      Pulses: Normal pulses.   Pulmonary:      Effort: Pulmonary effort is normal. No respiratory distress.   Neurological:      General: No focal deficit present.      Mental Status: He is alert and oriented to person, place, and time.   Psychiatric:         Mood and Affect: Mood normal.        Result Review :                Neurologic Exam     Mental Status   Oriented to person, place, and time.              Assessment and Plan    Diagnoses and all orders for this visit:    1. Sleep apnea, obstructive (Primary)    2. Lumbar post-laminectomy syndrome      Pt to try to adjust mask , will chant pressure ot 11-21/ps 3    Continue to follow with pain clinic for epidurals       Follow Up   Return in about 1 year (around 12/19/2024).  Patient was given instructions and counseling regarding his condition or for health maintenance advice. Please see specific information pulled into the AVS if appropriate.         This document has been " electronically signed by Joseph Seipel, MD on December 19, 2023 13:46 EST

## 2023-12-19 ENCOUNTER — OFFICE VISIT (OUTPATIENT)
Dept: NEUROLOGY | Facility: CLINIC | Age: 83
End: 2023-12-19
Payer: MEDICARE

## 2023-12-19 VITALS — WEIGHT: 203 LBS | BODY MASS INDEX: 27.5 KG/M2 | HEIGHT: 72 IN

## 2023-12-19 DIAGNOSIS — G47.33 SLEEP APNEA, OBSTRUCTIVE: Primary | ICD-10-CM

## 2023-12-19 DIAGNOSIS — M96.1 LUMBAR POST-LAMINECTOMY SYNDROME: ICD-10-CM

## 2023-12-19 PROCEDURE — 1159F MED LIST DOCD IN RCRD: CPT | Performed by: PSYCHIATRY & NEUROLOGY

## 2023-12-19 PROCEDURE — 99214 OFFICE O/P EST MOD 30 MIN: CPT | Performed by: PSYCHIATRY & NEUROLOGY

## 2023-12-19 PROCEDURE — 1160F RVW MEDS BY RX/DR IN RCRD: CPT | Performed by: PSYCHIATRY & NEUROLOGY

## 2023-12-19 RX ORDER — IBUPROFEN 600 MG/1
1 TABLET ORAL 3 TIMES DAILY PRN
COMMUNITY

## 2023-12-19 RX ORDER — RANITIDINE 150 MG/1
CAPSULE ORAL
COMMUNITY

## 2023-12-19 RX ORDER — CHLORHEXIDINE GLUCONATE ORAL RINSE 1.2 MG/ML
SOLUTION DENTAL
COMMUNITY

## 2023-12-19 RX ORDER — METRONIDAZOLE 10 MG/G
GEL TOPICAL
COMMUNITY

## 2024-01-03 ENCOUNTER — TELEPHONE (OUTPATIENT)
Dept: CARDIOLOGY | Facility: CLINIC | Age: 84
End: 2024-01-03
Payer: MEDICARE

## 2024-01-03 NOTE — TELEPHONE ENCOUNTER
FACILITY: [facility]  DR: Abelino Zhao   PHONE: [phone]  FAX: [phone]  PROCEDURE: MOH's Reconstruction   SCHEDULED: 02/14/2024  MEDS TO HOLD: no blood thinners

## 2024-01-09 ENCOUNTER — OFFICE VISIT (OUTPATIENT)
Dept: CARDIOLOGY | Facility: CLINIC | Age: 84
End: 2024-01-09
Payer: MEDICARE

## 2024-01-09 VITALS
HEIGHT: 72 IN | BODY MASS INDEX: 28.71 KG/M2 | DIASTOLIC BLOOD PRESSURE: 67 MMHG | SYSTOLIC BLOOD PRESSURE: 116 MMHG | OXYGEN SATURATION: 92 % | HEART RATE: 74 BPM | WEIGHT: 212 LBS

## 2024-01-09 DIAGNOSIS — G47.33 SLEEP APNEA, OBSTRUCTIVE: ICD-10-CM

## 2024-01-09 DIAGNOSIS — I10 ESSENTIAL HYPERTENSION: ICD-10-CM

## 2024-01-09 DIAGNOSIS — E78.00 PURE HYPERCHOLESTEROLEMIA: ICD-10-CM

## 2024-01-09 DIAGNOSIS — I25.118 CORONARY ARTERY DISEASE OF NATIVE ARTERY OF NATIVE HEART WITH STABLE ANGINA PECTORIS: Primary | ICD-10-CM

## 2024-01-09 PROCEDURE — 1159F MED LIST DOCD IN RCRD: CPT | Performed by: INTERNAL MEDICINE

## 2024-01-09 PROCEDURE — 1160F RVW MEDS BY RX/DR IN RCRD: CPT | Performed by: INTERNAL MEDICINE

## 2024-01-09 PROCEDURE — 3078F DIAST BP <80 MM HG: CPT | Performed by: INTERNAL MEDICINE

## 2024-01-09 PROCEDURE — 3074F SYST BP LT 130 MM HG: CPT | Performed by: INTERNAL MEDICINE

## 2024-01-09 PROCEDURE — 99214 OFFICE O/P EST MOD 30 MIN: CPT | Performed by: INTERNAL MEDICINE

## 2024-01-09 RX ORDER — MELATONIN
2000 DAILY
COMMUNITY

## 2024-01-09 NOTE — PROGRESS NOTES
"    Subjective:     Encounter Date:01/09/2024      Patient ID: Naun Brooks is a 83 y.o. male.    Chief Complaint:  History of Present Illness 83-year-old white male with history of coronary disease history of hypertension hyperlipidemia sleep apnea presents to office for follow-up.  Patient is currently stable without any symptoms of chest pain or shortness of breath at rest or exertion.  No complaint of any PND orthopnea.  No palpitation but has occasional dizziness.  No syncope or swelling of the feet.  Patient is taking all her medicines regularly.  Patient does not smoke.    The following portions of the patient's history were reviewed and updated as appropriate: allergies, current medications, past family history, past medical history, past social history, past surgical history, and problem list.  Past Medical History:   Diagnosis Date    Angina pectoris     Arthritis     Chronic vertigo 12/7/2016    Coronary artery disease     GERD (gastroesophageal reflux disease)     Glaucoma     Hyperlipidemia     Hypertension     Memory loss      Past Surgical History:   Procedure Laterality Date    BRAIN SURGERY      CENTRAL SHUNT      COLONOSCOPY      COLONOSCOPY N/A 10/18/2019    Procedure: COLONOSCOPY with polypectomy x 2 and fulgurate 1 polyp;  Surgeon: Kevin Cuevas MD;  Location: [facility] ENDOSCOPY;  Service: Gastroenterology    EYE SURGERY      HEMORRHOID BANDING      JOINT REPLACEMENT      hip and knee    PROSTATE SURGERY      REPLACEMENT TOTAL KNEE Right      /67 (BP Location: Left arm, Patient Position: Sitting, Cuff Size: Adult)   Pulse 74   Ht 182.9 cm (72\")   Wt 96.2 kg (212 lb)   SpO2 92%   BMI 28.75 kg/m²   Family History   Problem Relation Age of Onset    Heart disease Mother     Stroke Father        Current Outpatient Medications:     ascorbic acid (VITAMIN C) 1000 MG tablet, Take 1 tablet by mouth Daily., Disp: , Rfl:     aspirin 81 MG tablet, Take 1 tablet by mouth Daily., Disp: , " Rfl:     buPROPion XL (WELLBUTRIN XL) 150 MG 24 hr tablet, Take 1 tablet by mouth Daily., Disp: , Rfl:     Cholecalciferol 25 MCG (1000 UT) tablet, Take 2 tablets by mouth Daily., Disp: , Rfl:     Cyanocobalamin (B-12) 1000 MCG tablet controlled-release, Take 1 tablet by mouth Daily., Disp: , Rfl: 11    dorzolamide-timolol (COSOPT) 22.3-6.8 MG/ML ophthalmic solution, Administer 1 drop to both eyes 2 (Two) Times a Day., Disp: , Rfl:     escitalopram (LEXAPRO) 20 MG tablet, Take 1 tablet by mouth Daily., Disp: , Rfl:     famotidine (PEPCID) 20 MG tablet, Take 1 tablet by mouth 2 (Two) Times a Day., Disp: , Rfl:     fluticasone (FLONASE) 50 MCG/ACT nasal spray, 2 sprays into the nostril(s) as directed by provider Daily., Disp: , Rfl:     gabapentin (NEURONTIN) 300 MG capsule, Take 1 capsule by mouth 3 (Three) Times a Day., Disp: , Rfl:     ibuprofen (ADVIL,MOTRIN) 600 MG tablet, Take 1 tablet by mouth 3 (Three) Times a Day As Needed., Disp: , Rfl:     Omega-3 Fatty Acids (EQL OMEGA 3 FISH OIL) 1400 MG capsule, Take 1,600 mg by mouth 2 (Two) Times a Day., Disp: , Rfl:     valsartan-hydrochlorothiazide (DIOVAN-HCT) 160-12.5 MG per tablet, Take 1 tablet by mouth 2 (Two) Times a Day., Disp: , Rfl:   Allergies   Allergen Reactions    Dust Mite Extract Irritability    Molds & Smuts Irritability    Tree Extract Irritability     Social History     Socioeconomic History    Marital status:    Tobacco Use    Smoking status: Former     Passive exposure: Past    Smokeless tobacco: Never   Vaping Use    Vaping Use: Never used   Substance and Sexual Activity    Alcohol use: No    Drug use: No    Sexual activity: Yes     Partners: Female     Review of Systems   Constitutional: Negative for malaise/fatigue.   Cardiovascular:  Negative for chest pain, dyspnea on exertion, leg swelling and palpitations.   Respiratory:  Negative for cough and shortness of breath.    Gastrointestinal:  Negative for abdominal pain, nausea and  vomiting.   Neurological:  Positive for light-headedness (in the AM). Negative for dizziness, focal weakness, headaches and numbness.   All other systems reviewed and are negative.             Objective:     Constitutional:       Appearance: Well-developed.   Eyes:      General: No scleral icterus.     Conjunctiva/sclera: Conjunctivae normal.   HENT:      Head: Normocephalic and atraumatic.   Neck:      Vascular: No carotid bruit or JVD.   Pulmonary:      Effort: Pulmonary effort is normal.      Breath sounds: Normal breath sounds. No wheezing. No rales.   Cardiovascular:      Normal rate. Regular rhythm.   Pulses:     Intact distal pulses.   Abdominal:      General: Bowel sounds are normal.      Palpations: Abdomen is soft.   Musculoskeletal:      Cervical back: Normal range of motion and neck supple. Skin:     General: Skin is warm and dry.      Findings: No rash.   Neurological:      Mental Status: Alert.       Procedures    Lab Review:         MDM    #1 coronary disease  Patient has nonobstructive coronary artery disease in the past and is currently stable on medications with normal function  2.  Hypertension  Patient blood pressure currently stable on medications  3.  Hyperlipidemia  Patient is currently on statins and the lipid levels are well within normal limits  4.  Sleep apnea  Patient is sleep apnea and uses a CPAP machine    Patient's previous medical records, labs, and EKG were reviewed and discussed with the patient at today's visit.

## 2024-01-10 ENCOUNTER — TELEPHONE (OUTPATIENT)
Dept: CARDIOLOGY | Facility: CLINIC | Age: 84
End: 2024-01-10
Payer: MEDICARE

## 2024-01-10 NOTE — TELEPHONE ENCOUNTER
FACILITY: [facility]   DR: Abelino Zhao  PHONE: [phone]  FAX: [phone]  PROCEDURE: MOH's Reconstructive Left Inferior Eyelid    SCHEDULED: 02/14/24  MEDS TO HOLD: ASA 7 day old

## 2024-01-26 NOTE — PROGRESS NOTES
Medication: Metoprolol 50mg  Last office visit date: 12.05.2023  Medication Refill Protocol Failed.   Last recorded pulse is greater than 49      Physical Therapy Daily Progress Note  Naun Brooks   1940   Primary Diagnosis: Degenerative disc disease, lumbar [M51.36]   Type: THERAPY  Noted: 10/12/2020   10/26/2020   Visits: 4       Subjective   Pt reports: Pt feeling stronger. No recent falls. HEP going well.      Objective     See Exercise, Manual, and Modality Logs for complete treatment.     Patient Education: HEP    Assessment/Plan Fatigued post rx. Progressing well.      Progress per Plan of Care            Timed:         Manual Therapy:         mins  57585;     Therapeutic Exercise:    45     mins  38252;     Neuromuscular Génesis:       mins  89071;    Therapeutic Activity:        mins  74413;     Gait Training:           mins  31892;     Ultrasound:          mins  17422;    Ionto                                   mins   23152  Self Care                            mins   61117    Un-Timed:  Electrical Stimulation:         mins  21711 ( );  Traction          mins 40224  Low Eval          Mins  48559  Mod Eval          Mins  90822  High Eval                            Mins  89379  Re-Eval                               mins  89952    Timed Treatment:   45   mins   Total Treatment:     45   mins    Harpreet Dupree, PT, DPT, OCS  [place] license: [id number]  Physical Therapist

## 2024-05-16 ENCOUNTER — OFFICE (AMBULATORY)
Dept: URBAN - METROPOLITAN AREA CLINIC 64 | Facility: CLINIC | Age: 84
End: 2024-05-16
Payer: MEDICARE

## 2024-05-16 VITALS — HEART RATE: 55 BPM | SYSTOLIC BLOOD PRESSURE: 125 MMHG | DIASTOLIC BLOOD PRESSURE: 71 MMHG | WEIGHT: 210 LBS

## 2024-05-16 DIAGNOSIS — L29.0 PRURITUS ANI: ICD-10-CM

## 2024-05-16 DIAGNOSIS — R14.0 ABDOMINAL DISTENSION (GASEOUS): ICD-10-CM

## 2024-05-16 DIAGNOSIS — Z86.010 PERSONAL HISTORY OF COLONIC POLYPS: ICD-10-CM

## 2024-05-16 DIAGNOSIS — K21.9 GASTRO-ESOPHAGEAL REFLUX DISEASE WITHOUT ESOPHAGITIS: ICD-10-CM

## 2024-05-16 PROCEDURE — 99214 OFFICE O/P EST MOD 30 MIN: CPT | Performed by: NURSE PRACTITIONER

## 2024-05-16 RX ORDER — FAMOTIDINE 20 MG/1
20 TABLET, FILM COATED ORAL
Qty: 90 | Refills: 3 | Status: ACTIVE

## 2024-07-16 ENCOUNTER — OFFICE VISIT (OUTPATIENT)
Dept: CARDIOLOGY | Facility: CLINIC | Age: 84
End: 2024-07-16
Payer: MEDICARE

## 2024-07-16 VITALS
HEART RATE: 56 BPM | HEIGHT: 72 IN | WEIGHT: 208 LBS | BODY MASS INDEX: 28.17 KG/M2 | SYSTOLIC BLOOD PRESSURE: 133 MMHG | OXYGEN SATURATION: 94 % | DIASTOLIC BLOOD PRESSURE: 64 MMHG

## 2024-07-16 DIAGNOSIS — G47.33 SLEEP APNEA, OBSTRUCTIVE: ICD-10-CM

## 2024-07-16 DIAGNOSIS — I10 ESSENTIAL HYPERTENSION: ICD-10-CM

## 2024-07-16 DIAGNOSIS — E78.00 PURE HYPERCHOLESTEROLEMIA: ICD-10-CM

## 2024-07-16 DIAGNOSIS — I25.118 CORONARY ARTERY DISEASE OF NATIVE ARTERY OF NATIVE HEART WITH STABLE ANGINA PECTORIS: Primary | ICD-10-CM

## 2024-07-16 PROCEDURE — 99214 OFFICE O/P EST MOD 30 MIN: CPT | Performed by: INTERNAL MEDICINE

## 2024-07-16 PROCEDURE — 1159F MED LIST DOCD IN RCRD: CPT | Performed by: INTERNAL MEDICINE

## 2024-07-16 PROCEDURE — 3078F DIAST BP <80 MM HG: CPT | Performed by: INTERNAL MEDICINE

## 2024-07-16 PROCEDURE — 3075F SYST BP GE 130 - 139MM HG: CPT | Performed by: INTERNAL MEDICINE

## 2024-07-16 PROCEDURE — 1160F RVW MEDS BY RX/DR IN RCRD: CPT | Performed by: INTERNAL MEDICINE

## 2024-07-16 NOTE — PROGRESS NOTES
"    Subjective:     Encounter Date:07/16/2024      Patient ID: Naun Brooks is a 84 y.o. male.    Chief Complaint:  Follow-upCurrent symptoms include no chest pain, no dizziness, no nausea, no palpitations, no shortness of breath, no headaches, no focal weakness, no abdominal pain and no cough.   84-year-old white male with history of coronary artery disease hypertension hyperlipidemia sleep presents to the office for a follow-up.  Patient is currently stable without any symptoms of chest pain or shortness of breath at rest or exertion.  No complaint of any PND or orthopnea.  No palpitation dizziness syncope or swelling of the feet.  Patient is taking all the medicines regularly.  Patient does not smoke.    The following portions of the patient's history were reviewed and updated as appropriate: allergies, current medications, past family history, past medical history, past social history, past surgical history, and problem list.  Past Medical History:   Diagnosis Date    Angina pectoris     Arthritis     Chronic vertigo 12/7/2016    Coronary artery disease     GERD (gastroesophageal reflux disease)     Glaucoma     Hyperlipidemia     Hypertension     Memory loss      Past Surgical History:   Procedure Laterality Date    BRAIN SURGERY      CENTRAL SHUNT      COLONOSCOPY      COLONOSCOPY N/A 10/18/2019    Procedure: COLONOSCOPY with polypectomy x 2 and fulgurate 1 polyp;  Surgeon: Kevin Cuevas MD;  Location: [facility];  Service: Gastroenterology    EYE SURGERY      HEMORRHOID BANDING      JOINT REPLACEMENT      hip and knee    PROSTATE SURGERY      REPLACEMENT TOTAL KNEE Right      /64   Pulse 56   Ht 182.9 cm (72\")   Wt 94.3 kg (208 lb)   SpO2 94%   BMI 28.21 kg/m²   Family History   Problem Relation Age of Onset    Heart disease Mother     Stroke Father        Current Outpatient Medications:     ascorbic acid (VITAMIN C) 1000 MG tablet, Take 1 tablet by mouth Daily., Disp: , Rfl:     " aspirin 81 MG tablet, Take 1 tablet by mouth Daily., Disp: , Rfl:     buPROPion XL (WELLBUTRIN XL) 150 MG 24 hr tablet, Take 1 tablet by mouth Daily., Disp: , Rfl:     Cholecalciferol 25 MCG (1000 UT) tablet, Take 2 tablets by mouth Daily., Disp: , Rfl:     Cyanocobalamin (B-12) 1000 MCG tablet controlled-release, Take 1 tablet by mouth Daily., Disp: , Rfl: 11    dorzolamide-timolol (COSOPT) 22.3-6.8 MG/ML ophthalmic solution, Administer 1 drop to both eyes 2 (Two) Times a Day., Disp: , Rfl:     escitalopram (LEXAPRO) 20 MG tablet, Take 1 tablet by mouth Daily., Disp: , Rfl:     famotidine (PEPCID) 20 MG tablet, Take 1 tablet by mouth 2 (Two) Times a Day., Disp: , Rfl:     fluticasone (FLONASE) 50 MCG/ACT nasal spray, 2 sprays into the nostril(s) as directed by provider Daily., Disp: , Rfl:     ibuprofen (ADVIL,MOTRIN) 600 MG tablet, Take 1 tablet by mouth 3 (Three) Times a Day As Needed., Disp: , Rfl:     Omega-3 Fatty Acids (EQL OMEGA 3 FISH OIL) 1400 MG capsule, Take 1,600 mg by mouth 2 (Two) Times a Day., Disp: , Rfl:     valsartan-hydrochlorothiazide (DIOVAN-HCT) 160-12.5 MG per tablet, Take 1 tablet by mouth 2 (Two) Times a Day., Disp: , Rfl:     gabapentin (NEURONTIN) 300 MG capsule, Take 1 capsule by mouth 3 (Three) Times a Day. (Patient not taking: Reported on 7/16/2024), Disp: , Rfl:   Allergies   Allergen Reactions    Dust Mite Extract Irritability    Molds & Smuts Irritability    Tree Extract Irritability     Social History     Socioeconomic History    Marital status:    Tobacco Use    Smoking status: Former     Passive exposure: Past    Smokeless tobacco: Never   Vaping Use    Vaping status: Never Used   Substance and Sexual Activity    Alcohol use: No    Drug use: No    Sexual activity: Yes     Partners: Female     Review of Systems   Constitutional: Negative for malaise/fatigue.   Cardiovascular:  Negative for chest pain, dyspnea on exertion, leg swelling and palpitations.   Respiratory:   Negative for cough and shortness of breath.    Gastrointestinal:  Negative for abdominal pain, nausea and vomiting.   Neurological:  Negative for dizziness, focal weakness, headaches, light-headedness and numbness.   All other systems reviewed and are negative.             Objective:     Constitutional:       Appearance: Well-developed.   Eyes:      General: No scleral icterus.     Conjunctiva/sclera: Conjunctivae normal.   HENT:      Head: Normocephalic and atraumatic.   Neck:      Vascular: No carotid bruit or JVD.   Pulmonary:      Effort: Pulmonary effort is normal.      Breath sounds: Normal breath sounds. No wheezing. No rales.   Cardiovascular:      Normal rate. Regular rhythm.   Pulses:     Intact distal pulses.   Abdominal:      General: Bowel sounds are normal.      Palpations: Abdomen is soft.   Musculoskeletal:      Cervical back: Normal range of motion and neck supple. Skin:     General: Skin is warm and dry.      Findings: No rash.   Neurological:      Mental Status: Alert.       Procedures    Lab Review:         MDM    #1 coronary artery disease  Patient has nonobstructive disease in the past and is currently stable on medications with normal function  2.  Hypertension  Patient blood pressure is currently stable on valsartan hydrochlorothiazide  3.  Hyperlipidemia  Patient is on over-the-counter medicines and does not take any statins and is followed by the primary care doctor  4.  Sleep apnea  Patient is sleep apnea and uses CPAP machine    Patient's previous medical records, labs, and EKG were reviewed and discussed with the patient at today's visit.

## 2024-12-06 NOTE — PROGRESS NOTES
Chief Complaint  Follow-up (ARETHA AND NEUROPATHY)    Subjective          Naun Brooks presents to [facility] for NEUROPATHY  History of Present Illness  Patient is here to f/u on neuropathy patient states neuropathy is about the same since last visit.    SUSHANT le  Patient had Split Night 7/24/2023,   AHI 31.8 Lowest O2 88%    Download, on 11-21 BiPAP,  ahi 10 avg use 6hr 48min  excessive mask leak     EPWORTH SLEEPINESS SCALE  Sitting and reading  JS [place] Sleepiness: 3   WatchingTV  [place] Sleepiness: 3   Sitting, inactive, in a public place  JS [place] Sleepiness: 3   As a passenger in a car for 1 hour w/o a break  JS [place] Sleepiness: 3   Lying down to rest in the afternoon  JS [place] Sleepiness: 3   Sitting and talking to someone  JS [place] Sleepiness: 1  Sitting quietly after a lunch  JS [place] Sleepiness: 2  In a car, while stopped for traffic or a light  JS [place] Sleepiness: 1  Total 18              ====PREV. OV 12/19/23======  New patient by Dr. Lee for neuropathy. ( Patient currently see Dr. Seipel for aretha only)     Onset: Over 5 years  Location: right leg only, epidural helped with the pain last time or two.  Gait: walks with a flores  Medication: Injections, and Gabapentin  Symptoms: no numbness at the moment due to the injection/  The ablation procedure did not help.   Saw dr reyna and had one back  surgery, had pain relived by the surgery.   Recent EMG showed right L5 radiculopathy     ===EMG RLE 9/8/23====  Impression:   This is an abnormal study.  There is evidence of neurogenic change both acute and chronic predominantly in the right L5 myotome.  Mri 2017 after back surgery  L4-L5: There is disc desiccation with anterior and lateral osteophytes a mild  disc bulge which is stable.  Posterior elements are intact.  There is mild  facet arthropathy and ligamentum flavum thickening.  These all combine to  cause moderate to severe right and moderate left neural  foraminal narrowing.  There is no significant central canal narrowing.          Current Outpatient Medications:     ascorbic acid (VITAMIN C) 1000 MG tablet, Take 1 tablet by mouth Daily., Disp: , Rfl:     aspirin 81 MG tablet, Take 1 tablet by mouth Daily., Disp: , Rfl:     buPROPion XL (WELLBUTRIN XL) 150 MG 24 hr tablet, Take 1 tablet by mouth Daily., Disp: , Rfl:     Cholecalciferol 25 MCG (1000 UT) tablet, Take 2 tablets by mouth Daily., Disp: , Rfl:     Cyanocobalamin (B-12) 1000 MCG tablet controlled-release, Take 1 tablet by mouth Daily., Disp: , Rfl: 11    donepezil (ARICEPT) 23 MG tablet, 0, Disp: , Rfl:     dorzolamide (TRUSOPT) 2 % ophthalmic solution, 0, Disp: , Rfl:     dorzolamide-timolol (COSOPT) 22.3-6.8 MG/ML ophthalmic solution, Administer 1 drop to both eyes 2 (Two) Times a Day., Disp: , Rfl:     DULoxetine (Cymbalta) 60 MG capsule, 90, Disp: , Rfl:     escitalopram (LEXAPRO) 20 MG tablet, Take 1 tablet by mouth Daily., Disp: , Rfl:     famotidine (PEPCID) 20 MG tablet, Take 1 tablet by mouth 2 (Two) Times a Day., Disp: , Rfl:     fluticasone (FLONASE) 50 MCG/ACT nasal spray, Administer 2 sprays into the nostril(s) as directed by provider Daily., Disp: , Rfl:     ibuprofen (ADVIL,MOTRIN) 600 MG tablet, Take 1 tablet by mouth 3 (Three) Times a Day As Needed., Disp: , Rfl:     Omega-3 Fatty Acids (EQL OMEGA 3 FISH OIL) 1400 MG capsule, Take 1,600 mg by mouth 2 (Two) Times a Day., Disp: , Rfl:     valsartan-hydrochlorothiazide (DIOVAN-HCT) 160-12.5 MG per tablet, Take 1 tablet by mouth 2 (Two) Times a Day., Disp: , Rfl:     Review of Systems   Constitutional:  Negative for fatigue.   HENT:  Negative for ear discharge, ear pain and nosebleeds.    Respiratory:  Positive for apnea.    Cardiovascular:  Negative for chest pain and leg swelling.   Musculoskeletal:  Positive for back pain. Negative for neck pain.   Neurological:  Negative for dizziness and headaches.   Psychiatric/Behavioral:  Negative for  "sleep disturbance.    All other systems reviewed and are negative.         Objective:    Vital Signs:   /79   Pulse 57   Resp 18   Ht 182.9 cm (72\")   Wt 97.5 kg (215 lb)   BMI 29.16 kg/m²     Physical Exam  Vitals reviewed.   Cardiovascular:      Pulses: Normal pulses.   Pulmonary:      Effort: Pulmonary effort is normal. No respiratory distress.   Neurological:      Mental Status: He is alert and oriented to person, place, and time.   Psychiatric:         Mood and Affect: Mood normal.        Result Review :                Neurological Exam  Mental Status  Alert. Oriented to person, place, and time.        Assessment and Plan    Diagnoses and all orders for this visit:    1. Sleep apnea, obstructive (Primary)     To help with air leak will decrease pressure to min 8 max 15 ps 3    Follow Up   Return in about 1 year (around 12/9/2025).  Patient was given instructions and counseling regarding his condition or for health maintenance advice. Please see specific information pulled into the AVS if appropriate.     This document has been electronically signed by Joseph Seipel, MD on December 9, 2024 14:55 EST      "

## 2024-12-09 ENCOUNTER — OFFICE VISIT (OUTPATIENT)
Dept: NEUROLOGY | Facility: CLINIC | Age: 84
End: 2024-12-09
Payer: MEDICARE

## 2024-12-09 VITALS
HEIGHT: 72 IN | RESPIRATION RATE: 18 BRPM | SYSTOLIC BLOOD PRESSURE: 136 MMHG | WEIGHT: 215 LBS | HEART RATE: 57 BPM | DIASTOLIC BLOOD PRESSURE: 79 MMHG | BODY MASS INDEX: 29.12 KG/M2

## 2024-12-09 DIAGNOSIS — G47.33 SLEEP APNEA, OBSTRUCTIVE: Primary | ICD-10-CM

## 2024-12-09 PROCEDURE — 99213 OFFICE O/P EST LOW 20 MIN: CPT | Performed by: PSYCHIATRY & NEUROLOGY

## 2024-12-09 PROCEDURE — 3078F DIAST BP <80 MM HG: CPT | Performed by: PSYCHIATRY & NEUROLOGY

## 2024-12-09 PROCEDURE — 1159F MED LIST DOCD IN RCRD: CPT | Performed by: PSYCHIATRY & NEUROLOGY

## 2024-12-09 PROCEDURE — 3075F SYST BP GE 130 - 139MM HG: CPT | Performed by: PSYCHIATRY & NEUROLOGY

## 2024-12-09 PROCEDURE — 1160F RVW MEDS BY RX/DR IN RCRD: CPT | Performed by: PSYCHIATRY & NEUROLOGY

## 2024-12-09 RX ORDER — DONEPEZIL HYDROCHLORIDE 23 MG/1
TABLET, FILM COATED ORAL
COMMUNITY

## 2024-12-09 RX ORDER — DULOXETIN HYDROCHLORIDE 60 MG/1
CAPSULE, DELAYED RELEASE ORAL
COMMUNITY

## 2024-12-09 RX ORDER — DORZOLAMIDE HCL 20 MG/ML
SOLUTION/ DROPS OPHTHALMIC
COMMUNITY

## 2025-03-17 NOTE — PROGRESS NOTES
"    Subjective:     Encounter Date:03/20/2025      Patient ID: Naun Brooks is a 84 y.o. male.    Chief Complaint:  History of Present Illness    Naun Brooks is a 84 y.o. male with a history of nonobstructive CAD, hyperlipidemia, hypertension, GERD, arthritis who presents to the office today for routine cardiac care/follow-up.   Patient seen and examined, labs and chart reviewed. Patient states he is doing well from cardiology standpoint as he denies chest pain, shortness of breath, fatigue/weakness, palpitations, lightheadedness/dizziness, edema, syncope, nausea, vomiting.  EKG today shows sinus rhythm with RBBB and nonspecific ST-T wave changes.  Vital signs are stable today.  He takes all medications as prescribed.      The following portions of the patient's history were reviewed and updated as appropriate: allergies, current medications, past family history, past medical history, past social history, past surgical history, and problem list.    Past Medical History:   Diagnosis Date    Angina pectoris     Arthritis     Chronic vertigo 12/7/2016    Coronary artery disease     GERD (gastroesophageal reflux disease)     Glaucoma     Hyperlipidemia     Hypertension     Memory loss      Past Surgical History:   Procedure Laterality Date    BRAIN SURGERY      CENTRAL SHUNT      COLONOSCOPY      COLONOSCOPY N/A 10/18/2019    Procedure: COLONOSCOPY with polypectomy x 2 and fulgurate 1 polyp;  Surgeon: Kevin Cuevas MD;  Location: [facility] ENDOSCOPY;  Service: Gastroenterology    EYE SURGERY      HEMORRHOID BANDING      JOINT REPLACEMENT      hip and knee    PROSTATE SURGERY      REPLACEMENT TOTAL KNEE Right      /51 (BP Location: Left arm, Patient Position: Sitting, Cuff Size: Adult)   Pulse 72   Ht 182.9 cm (72\")   Wt 101 kg (223 lb)   SpO2 93%   BMI 30.24 kg/m²     Family History   Problem Relation Age of Onset    Heart disease Mother     Stroke Father        Current Outpatient Medications:     " ascorbic acid (VITAMIN C) 1000 MG tablet, Take 1 tablet by mouth Daily., Disp: , Rfl:     aspirin 81 MG tablet, Take 1 tablet by mouth Daily., Disp: , Rfl:     buPROPion XL (WELLBUTRIN XL) 150 MG 24 hr tablet, Take 1 tablet by mouth Daily., Disp: , Rfl:     Cholecalciferol 25 MCG (1000 UT) tablet, Take 2 tablets by mouth Daily., Disp: , Rfl:     Cyanocobalamin (B-12) 1000 MCG tablet controlled-release, Take 1 tablet by mouth Daily., Disp: , Rfl: 11    donepezil (ARICEPT) 23 MG tablet, 0, Disp: , Rfl:     dorzolamide (TRUSOPT) 2 % ophthalmic solution, 0, Disp: , Rfl:     dorzolamide-timolol (COSOPT) 22.3-6.8 MG/ML ophthalmic solution, Administer 1 drop to both eyes 2 (Two) Times a Day., Disp: , Rfl:     DULoxetine (Cymbalta) 60 MG capsule, 90, Disp: , Rfl:     famotidine (PEPCID) 20 MG tablet, Take 1 tablet by mouth 2 (Two) Times a Day., Disp: , Rfl:     fluticasone (FLONASE) 50 MCG/ACT nasal spray, Administer 2 sprays into the nostril(s) as directed by provider Daily., Disp: , Rfl:     ibuprofen (ADVIL,MOTRIN) 600 MG tablet, Take 1 tablet by mouth 3 (Three) Times a Day As Needed., Disp: , Rfl:     Omega-3 Fatty Acids (EQL OMEGA 3 FISH OIL) 1400 MG capsule, Take 1,600 mg by mouth 2 (Two) Times a Day., Disp: , Rfl:     tamsulosin (FLOMAX) 0.4 MG capsule 24 hr capsule, Take 1 capsule by mouth Daily., Disp: , Rfl:     valsartan-hydrochlorothiazide (DIOVAN-HCT) 160-12.5 MG per tablet, Take 1 tablet by mouth 2 (Two) Times a Day., Disp: , Rfl:     escitalopram (LEXAPRO) 20 MG tablet, Take 1 tablet by mouth Daily. (Patient not taking: Reported on 3/20/2025), Disp: , Rfl:     Allergies   Allergen Reactions    Dust Mite Extract Irritability    Molds & Smuts Irritability    Tree Extract Irritability     Social History     Socioeconomic History    Marital status:    Tobacco Use    Smoking status: Former     Types: Cigarettes     Start date:      Quit date:      Years since quittin.2     Passive exposure:  Past    Smokeless tobacco: Never   Vaping Use    Vaping status: Never Used   Substance and Sexual Activity    Alcohol use: No    Drug use: No    Sexual activity: Yes     Partners: Female     Review of Systems   Constitutional: Negative for malaise/fatigue.   Cardiovascular:  Negative for chest pain, dyspnea on exertion, leg swelling and palpitations.   Respiratory:  Negative for cough and shortness of breath.    Gastrointestinal:  Negative for abdominal pain, nausea and vomiting.   Neurological:  Negative for dizziness, focal weakness, headaches, light-headedness and numbness.   All other systems reviewed and are negative.         Objective:     Vitals reviewed.   Constitutional:       Appearance: Overweight and not in distress.   Eyes:      Pupils: Pupils are equal, round, and reactive to light.   Pulmonary:      Effort: Pulmonary effort is normal.      Breath sounds: Normal breath sounds.   Cardiovascular:      Normal rate. Regular rhythm.   Pulses:     Intact distal pulses.   Edema:     Peripheral edema absent.   Abdominal:      Palpations: Abdomen is soft.   Musculoskeletal: Normal range of motion.      Cervical back: Normal range of motion and neck supple. Skin:     General: Skin is warm and dry.   Neurological:      General: No focal deficit present.      Mental Status: Alert and oriented to person, place and time.           ECG 12 Lead    Date/Time: 3/20/2025 2:59 PM  Performed by: Katie Arias APRN    Authorized by: Katie Arias APRN  Comparison: compared with previous ECG from 10/12/2018  Rhythm: sinus rhythm  Rate: normal  Conduction: right bundle branch block  Other findings: non-specific ST-T wave changes    Clinical impression: abnormal EKG          LAB RESULTS (LAST 7 DAYS)  Lab Review:   Echocardiogram       Cardiac Catheterization   No results found for this or any previous visit.      CBC        BMP        CMP         BNP        TROPONIN        CoAg        Creatinine Clearance  CrCl cannot be  calculated (Patient's most recent lab result is older than the maximum 30 days allowed.).    ABG        Radiology  No radiology results for the last day          Assessment    Diagnoses and all orders for this visit:    1. Coronary artery disease involving native coronary artery of native heart without angina pectoris (Primary)  -     Stress Test With Myocardial Perfusion One Day; Future  -     ECG 12 Lead    2. Primary hypertension    3. Pure hypercholesterolemia    4. Sleep apnea, obstructive              MDM    Coronary artery disease, nonobstructive  Cardiac catheterization from 2018 with 50% disease to LCx  Denies chest pain  Previous ischemic workup from 2018  Aspirin  EKG shows sinus rhythm with RBBB, nonspecific ST-T wave changes  Obtain Myoview study at next office visit    Hypertension  Blood pressure 133/51  Valsartan-hydrochlorothiazide 160-12.5    Dyslipidemia  Omega-3 fatty acids  Previous LDL 59  Lipid panel with PCP    Goal LDL <70     ARETHA  complaint CPAP  Managed by Dr. Seipel     Patient's previous medical records, labs, and EKG were reviewed and discussed with the patient at today's visit.     Electronically signed by JROGE Dominguez, 03/20/25, 3:03 PM EDT.

## 2025-03-20 ENCOUNTER — OFFICE VISIT (OUTPATIENT)
Dept: CARDIOLOGY | Facility: CLINIC | Age: 85
End: 2025-03-20
Payer: MEDICARE

## 2025-03-20 VITALS
HEIGHT: 72 IN | SYSTOLIC BLOOD PRESSURE: 133 MMHG | BODY MASS INDEX: 30.2 KG/M2 | OXYGEN SATURATION: 93 % | WEIGHT: 223 LBS | DIASTOLIC BLOOD PRESSURE: 51 MMHG | HEART RATE: 72 BPM

## 2025-03-20 DIAGNOSIS — E78.00 PURE HYPERCHOLESTEROLEMIA: ICD-10-CM

## 2025-03-20 DIAGNOSIS — I10 PRIMARY HYPERTENSION: ICD-10-CM

## 2025-03-20 DIAGNOSIS — I25.10 CORONARY ARTERY DISEASE INVOLVING NATIVE CORONARY ARTERY OF NATIVE HEART WITHOUT ANGINA PECTORIS: Primary | ICD-10-CM

## 2025-03-20 DIAGNOSIS — G47.33 SLEEP APNEA, OBSTRUCTIVE: ICD-10-CM

## 2025-03-20 PROCEDURE — 1160F RVW MEDS BY RX/DR IN RCRD: CPT

## 2025-03-20 PROCEDURE — 3075F SYST BP GE 130 - 139MM HG: CPT

## 2025-03-20 PROCEDURE — 3078F DIAST BP <80 MM HG: CPT

## 2025-03-20 PROCEDURE — 93000 ELECTROCARDIOGRAM COMPLETE: CPT

## 2025-03-20 PROCEDURE — 1159F MED LIST DOCD IN RCRD: CPT

## 2025-03-20 PROCEDURE — 99214 OFFICE O/P EST MOD 30 MIN: CPT

## 2025-03-20 RX ORDER — TAMSULOSIN HYDROCHLORIDE 0.4 MG/1
1 CAPSULE ORAL DAILY
COMMUNITY
Start: 2025-02-24

## 2025-03-25 ENCOUNTER — HOSPITAL ENCOUNTER (OUTPATIENT)
Dept: CARDIOLOGY | Facility: HOSPITAL | Age: 85
Discharge: HOME | End: 2025-03-25
Payer: MEDICARE

## 2025-03-25 DIAGNOSIS — I25.10 CORONARY ARTERY DISEASE INVOLVING NATIVE CORONARY ARTERY OF NATIVE HEART WITHOUT ANGINA PECTORIS: ICD-10-CM

## 2025-03-25 LAB
BH CV REST NUCLEAR ISOTOPE DOSE: 11 MCI
BH CV STRESS BP STAGE 1: NORMAL
BH CV STRESS COMMENTS STAGE 1: NORMAL
BH CV STRESS DOSE REGADENOSON STAGE 1: 0.4
BH CV STRESS DURATION MIN STAGE 1: 0
BH CV STRESS DURATION SEC STAGE 1: 10
BH CV STRESS HR STAGE 1: 50
BH CV STRESS NUCLEAR ISOTOPE DOSE: 32.5 MCI
BH CV STRESS PROTOCOL 1: NORMAL
BH CV STRESS RECOVERY BP: NORMAL MMHG
BH CV STRESS RECOVERY HR: 70 BPM
BH CV STRESS STAGE 1: 1
MAXIMAL PREDICTED HEART RATE: 138 BPM
SPECT HRT GATED+EF W RNC IV: 58 %
STRESS BASELINE BP: NORMAL MMHG
STRESS BASELINE HR: 50 BPM
STRESS TARGET HR: 117 BPM

## 2025-03-25 PROCEDURE — 34310000005 TECHNETIUM SESTAMIBI

## 2025-03-25 PROCEDURE — A9500 TC99M SESTAMIBI: HCPCS

## 2025-03-25 PROCEDURE — 78452 HT MUSCLE IMAGE SPECT MULT: CPT

## 2025-03-25 PROCEDURE — 93018 CV STRESS TEST I&R ONLY: CPT | Performed by: INTERNAL MEDICINE

## 2025-03-25 PROCEDURE — 25010000002 REGADENOSON 0.4 MG/5ML SOLUTION

## 2025-03-25 PROCEDURE — 78452 HT MUSCLE IMAGE SPECT MULT: CPT | Performed by: INTERNAL MEDICINE

## 2025-03-25 PROCEDURE — 93017 CV STRESS TEST TRACING ONLY: CPT

## 2025-03-25 PROCEDURE — 93016 CV STRESS TEST SUPVJ ONLY: CPT | Performed by: NURSE PRACTITIONER

## 2025-03-25 RX ORDER — REGADENOSON 0.08 MG/ML
0.4 INJECTION, SOLUTION INTRAVENOUS
Status: COMPLETED | OUTPATIENT
Start: 2025-03-25 | End: 2025-03-25

## 2025-03-25 RX ADMIN — REGADENOSON 0.4 MG: 0.08 INJECTION, SOLUTION INTRAVENOUS at 10:24

## 2025-03-25 RX ADMIN — TECHNETIUM TC 99M SESTAMIBI 1 DOSE: 1 INJECTION INTRAVENOUS at 10:24

## 2025-03-25 RX ADMIN — TECHNETIUM TC 99M SESTAMIBI 1 DOSE: 1 INJECTION INTRAVENOUS at 08:35

## 2025-07-17 ENCOUNTER — OFFICE (AMBULATORY)
Dept: URBAN - METROPOLITAN AREA CLINIC 64 | Facility: CLINIC | Age: 85
End: 2025-07-17
Payer: MEDICARE

## 2025-07-17 VITALS — HEART RATE: 58 BPM | WEIGHT: 209 LBS | DIASTOLIC BLOOD PRESSURE: 46 MMHG | SYSTOLIC BLOOD PRESSURE: 104 MMHG

## 2025-07-17 DIAGNOSIS — L29.0 PRURITUS ANI: ICD-10-CM

## 2025-07-17 DIAGNOSIS — K59.00 CONSTIPATION, UNSPECIFIED: ICD-10-CM

## 2025-07-17 DIAGNOSIS — R12 HEARTBURN: ICD-10-CM

## 2025-07-17 PROBLEM — K64.1 SECOND DEGREE HEMORRHOIDS: Status: ACTIVE | Noted: 2022-01-13

## 2025-07-17 PROCEDURE — 99214 OFFICE O/P EST MOD 30 MIN: CPT

## 2025-07-17 RX ORDER — HYDROCORTISONE ACETATE, PRAMOXINE HCL 2.5; 1 G/100G; G/100G
CREAM TOPICAL
Qty: 30 | Refills: 1 | Status: ACTIVE
Start: 2022-01-10

## 2025-07-17 RX ORDER — FAMOTIDINE 20 MG/1
20 TABLET, FILM COATED ORAL
Qty: 90 | Refills: 3 | Status: ACTIVE
Start: 2025-07-17

## (undated) DEVICE — SNAR POLYP CAPTIVATOR HEX 27MM 240CM

## (undated) DEVICE — PK ENDO GI 50

## (undated) DEVICE — 3M™ PATIENT PLATE, CORDED, SPLIT, LARGE, 40 PER CASE, 1179: Brand: 3M™

## (undated) DEVICE — PAPR PRNT PK SONY W RIBN UPC55